# Patient Record
Sex: FEMALE | Race: WHITE | Employment: FULL TIME | URBAN - METROPOLITAN AREA
[De-identification: names, ages, dates, MRNs, and addresses within clinical notes are randomized per-mention and may not be internally consistent; named-entity substitution may affect disease eponyms.]

---

## 2021-07-21 NOTE — PROGRESS NOTES
PATIENT IDENTIFICATION  Name: Dayami Martinez  MRN: [id number]    Diagnoses:   Preventative health care  (primary encounter diagnosis)  Screening for diabetes mellitus  Screening for lipid disorders  Visit for screening mammogram  Anxiety and depression Maternal Grandmother         had and ASD   • Prostate Cancer Maternal Grandfather    • Diabetes Paternal Grandmother    • Diabetes Paternal Uncle      Past Surgical History:   Procedure Laterality Date   •      • HYSTERECTOMY  2017    irregular an stop or control worrying: Over half days  Worrying too much about different things   : Nearly every day  Trouble relaxing: Over half days  Being so restless that it's hard to sit still: Over half days  Becoming easily annoyed or irritable: Over half days Discussed treatment options. Will start lexapro, advised to start with 1/2 tab daily for one week then increase to 1 tab daily. Return to clinic in 4 to 6 weeks for re-evaluation and possible dose change.  Also referred to therapist, gave options of therapi

## 2021-07-28 NOTE — PROGRESS NOTES
Called and discussed SSRIs and NSAIDs risk with patient, will try for now, advised to watch for signs and symptoms of GI bleed and to stop immediately if this occurs. Will also check CBC in a few months as a precaution.    Patient also requesting claritin D

## 2021-09-22 NOTE — TELEPHONE ENCOUNTER
Attempted to reach pt re: Dr. Jane Allen. Unable to reach pt and left detailed msg to please return our call re: tomorrow's appt.      Joselito Zambrano

## 2021-09-22 NOTE — TELEPHONE ENCOUNTER
Called to screen for tomorrow's appointment due to comment of \"sinus headache\". Patient has history of sinusitis and Claritin has helped in the past. Not working this time. All on the right side causing constant headache and pressure.     Denies any othe

## 2021-09-23 NOTE — PROGRESS NOTES
Telehealth outside of [address] Verbal Consent   I conducted a telehealth visit with Dayana Lezama today, 09/23/21, which was completed using two-way, real-time interactive audio and video communication.  This has been done in good nhan to pro side of face feels pressure sensation right above and under the eye. No swelling or redness of eyelid. Has helped with rubbing sinus area. Has been told by partner that she was a little \"puffy\" around the eye area. No eye redness or eye pain.    H apparent distress and well developed and well nourished  HEENT: Normocephalic, atraumatic, per patient exam, slightly uncomfortable with firm palpation of forntal and maxillary sinus of R side. Lungs:normal respiratory effort.    Neuro: Gait normal   Skin

## 2021-10-15 PROBLEM — S99.912A INJURY OF LEFT ANKLE: Status: ACTIVE | Noted: 2021-10-15

## 2021-10-15 NOTE — ED PROVIDER NOTES
Patient Seen in: [facility]      History   Patient presents with:  Leg or Foot Injury: Either sprained or fractured left ankle. - Entered by patient    Stated Complaint: Leg or Foot Injury - Either sprained or fractured left ankle.     Subjec appears non-toxic. HEENT: Head is normocephalic, atraumatic. Nonicteric sclera, no conjunctival injection. No facial droop or slurred speech. No oral lesions or pallor. Mucous membranes moist.    Neck: Supple. Normal ROM.     Lungs:  Good inspiratory ef including chronic ligamentous calcifications and a chronic healed fracture of the distal tip of the lateral malleolus. Radha Motta is a question of subtle transverse lucency in the may also represent sequela of   the chronic healed fracture, however given overl medications    traMADol 50 MG Oral Tab  Take 1 tablet (50 mg total) by mouth every 6 (six) hours as needed for Pain., Normal, Disp-12 tablet, R-0    Misc. Devices (CRUTCH SET) Does not apply Misc  Please provide patient with a set of crutches. , Print, Disp

## 2021-10-15 NOTE — ED INITIAL ASSESSMENT (HPI)
Pt states about an hour ago was at a dog park when a few dogs roller her over and states twisted left ankle and fell over. Pt states having hx of breaking that ankle and having sprains.

## 2022-01-14 NOTE — PROGRESS NOTES
Sloan Mckinnon is a 48year old female. Patient presents with:  Hearing Loss: Pt says her hearing has been decreasing and would like a hearing test       HISTORY OF PRESENT ILLNESS  She presents with a history of decreased hearing.   She does have a fami Neuro Negative Tremors. Psych Negative Anxiety and depression. Integumentary Negative Frequent skin infections, pigment change and rash. Hema/Lymph Negative Easy bleeding and easy bruising.            PHYSICAL EXAM    Ht 5' 11\" (1.803 m)   Wt 185 l every 6 (six) hours as needed for Pain., Disp: 12 tablet, Rfl: 0  •  Misc. Devices (CRUTCH SET) Does not apply Misc, Please provide patient with a set of crutches., Disp: 1 each, Rfl: 0  •  loratadine 10 MG Oral Tab, Take 10 mg by mouth daily as needed. , D

## 2022-01-17 NOTE — TELEPHONE ENCOUNTER
Requested Prescriptions     Pending Prescriptions Disp Refills   • escitalopram 10 MG Oral Tab 90 tablet 1     Sig: Take 1 tablet (10 mg total) by mouth daily.      Last office visit: 9-23-21 virtual   Medication last refilled: 10-23-21

## 2024-05-03 ENCOUNTER — LAB ENCOUNTER (OUTPATIENT)
Dept: LAB | Facility: HOSPITAL | Age: 49
End: 2024-05-03
Attending: SURGERY
Payer: COMMERCIAL

## 2024-05-03 ENCOUNTER — OFFICE VISIT (OUTPATIENT)
Dept: OTOLARYNGOLOGY | Facility: CLINIC | Age: 49
End: 2024-05-03

## 2024-05-03 DIAGNOSIS — E04.1 THYROID NODULE: ICD-10-CM

## 2024-05-03 DIAGNOSIS — E04.1 THYROID NODULE: Primary | ICD-10-CM

## 2024-05-03 LAB
T3FREE SERPL-MCNC: 2.84 PG/ML (ref 2.4–4.2)
T4 FREE SERPL-MCNC: 1.3 NG/DL (ref 0.8–1.7)
TSI SER-ACNC: 2.84 MIU/ML (ref 0.55–4.78)

## 2024-05-03 PROCEDURE — 84481 FREE ASSAY (FT-3): CPT

## 2024-05-03 PROCEDURE — 36415 COLL VENOUS BLD VENIPUNCTURE: CPT

## 2024-05-03 PROCEDURE — 84443 ASSAY THYROID STIM HORMONE: CPT

## 2024-05-03 PROCEDURE — 84439 ASSAY OF FREE THYROXINE: CPT

## 2024-05-03 RX ORDER — MONTELUKAST SODIUM 10 MG/1
10 TABLET ORAL NIGHTLY
Qty: 30 TABLET | Refills: 3 | Status: SHIPPED | OUTPATIENT
Start: 2024-05-03

## 2024-05-03 RX ORDER — AZELASTINE 1 MG/ML
2 SPRAY, METERED NASAL 2 TIMES DAILY
Qty: 30 ML | Refills: 0 | Status: SHIPPED | OUTPATIENT
Start: 2024-05-03

## 2024-05-03 NOTE — PROGRESS NOTES
Jaquelin Hernandez is a 48 year old female.    Chief Complaint   Patient presents with    Thyroid Nodule     Thyroid nodules 2 medium size 1 large sized found on US thyroid done on 24       HISTORY OF PRESENT ILLNESS  She presents with a history of decreased hearing.  She does have a family history of hearing loss but denies any other significant otologic signs or symptoms.  No tinnitus no dizziness no history of otosclerosis in the family.  Audiogram was performed today demonstrating a conductive hearing loss on the right which is mild in nature with normal speech discrimination scores and tympanograms.  Normal hearing on the left.  Otosclerosis?      5/3/24 she presents with recent ultrasound demonstrating enlarged thyroid nodules on the right.  TR 3's and TR 5 nodule here for further evaluation and management.  Complains of throat discomfort and has recently been looked at for allergies.  Currently on no significant medications.      Social History     Socioeconomic History    Marital status: Single   Tobacco Use    Smoking status: Never    Smokeless tobacco: Never   Vaping Use    Vaping status: Never Used   Substance and Sexual Activity    Alcohol use: Never    Drug use: Never    Sexual activity: Yes     Partners: Female       Family History   Problem Relation Age of Onset    Uterine Cancer Mother 58    Breast Cancer Mother 55    Diabetes Father     Heart Disorder Maternal Grandmother         had and ASD    Prostate Cancer Maternal Grandfather     Diabetes Paternal Grandmother     Diabetes Paternal Uncle        Past Medical History:    Arthritis    Atrial septal defect (HCC)    corrected via surgery as a child at age 10.        Past Surgical History:   Procedure Laterality Date          Hysterectomy  2017    irregular and heavy cycles.     Needle biopsy left      benign    Splint, hammer toe           REVIEW OF SYSTEMS    System Neg/Pos Details   Constitutional Negative Fatigue, fever and weight  loss.   ENMT Negative Drooling.   Eyes Negative Blurred vision and vision changes.   Respiratory Negative Dyspnea and wheezing.   Cardio Negative Chest pain, irregular heartbeat/palpitations and syncope.   GI Negative Abdominal pain and diarrhea.   Endocrine Negative Cold intolerance and heat intolerance.   Neuro Negative Tremors.   Psych Negative Anxiety and depression.   Integumentary Negative Frequent skin infections, pigment change and rash.   Hema/Lymph Negative Easy bleeding and easy bruising.           PHYSICAL EXAM    [facility] 10/20/2014        Constitutional Normal Overall appearance - Normal.   Psychiatric Normal Orientation - Oriented to time, place, person & situation. Appropriate mood and affect.   Neck Exam Normal Inspection - Normal. Palpation - Normal. Parotid gland - Normal. Thyroid gland -enlarged right hemithyroid multinodular   Eyes Normal Conjunctiva - Right: Normal, Left: Normal. Pupil - Right: Normal, Left: Normal. Fundus - Right: Normal, Left: Normal.   Neurological Normal Memory - Normal. Cranial nerves - Cranial nerves II through XII grossly intact.   Head/Face Normal Facial features - Normal. Eyebrows - Normal. Skull - Normal.        Nasopharynx Normal External nose - Normal. Lips/teeth/gums - Normal. Tonsils - Normal. Oropharynx - Normal.   Ears Normal Inspection - Right: Normal, Left: Normal. Canal - Right: Normal, Left: Normal. TM - Right: Normal, Left: Normal.   Skin Normal Inspection - Normal.        Lymph Detail Normal Submental. Submandibular. Anterior cervical. Posterior cervical. Supraclavicular.        Nose/Mouth/Throat Normal External nose - Normal. Lips/teeth/gums - Normal. Tonsils - Normal. Oropharynx - Normal.   Nose/Mouth/Throat Normal Nares - Right: Normal Left: Normal. Septum -Normal  Turbinates - Right: Normal, Left: Normal.       Current Outpatient Medications:     montelukast 10 MG Oral Tab, Take 1 tablet (10 mg total) by mouth nightly., Disp: 30 tablet, Rfl: 3     loratadine-pseudoephedrine ER 5-120 MG Oral Tablet 12 Hr, Take 1 tablet by mouth every 12 (twelve) hours., Disp: 60 tablet, Rfl: 3    azelastine 0.1 % Nasal Solution, 2 sprays by Nasal route 2 (two) times daily., Disp: 30 mL, Rfl: 0    CELECOXIB 200 MG Oral Cap, TAKE 1 CAPSULE(200 MG) BY MOUTH DAILY, Disp: 90 capsule, Rfl: 0    escitalopram 10 MG Oral Tab, Take 1 tablet (10 mg total) by mouth daily., Disp: 90 tablet, Rfl: 0    Loratadine-Pseudoephedrine ER  MG Oral Tablet 24 Hr, Take 1 tablet by mouth daily., Disp: 30 tablet, Rfl: 1    traMADol 50 MG Oral Tab, Take 1 tablet (50 mg total) by mouth every 6 (six) hours as needed for Pain., Disp: 12 tablet, Rfl: 0    Misc. Devices (CRUTCH SET) Does not apply Misc, Please provide patient with a set of crutches., Disp: 1 each, Rfl: 0    loratadine 10 MG Oral Tab, Take 10 mg by mouth daily as needed., Disp: , Rfl:   ASSESSMENT AND PLAN    1. Thyroid nodule  Suspicious nodules of the right hemithyroid.  I did recommend ultrasound-guided biopsies of each.  In addition I did recommend getting some baseline thyroid function testing.  I will call in some Singulair Loratadine-D Astelin nasal spray for some congestive issues she is having and throat symptoms which I suspect are secondary to postnasal discharge.  She will return to see me after her biopsies to discuss further management but we did begin discussions regarding probable right hemithyroidectomy if no malignancy is found and total thyroidectomy if malignancy is noted.  - Assay, Thyroid Stim Hormone; Future  - Free T3 (Triiodothryronine); Future  - Free T4, (Free Thyroxine); Future  - US FINE NEEDLE ASPIRATION W GUIDE (CPT=10005); Future  - loratadine-pseudoephedrine ER 5-120 MG Oral Tablet 12 Hr; Take 1 tablet by mouth every 12 (twelve) hours.  Dispense: 60 tablet; Refill: 3        This note was prepared using Dragon Medical voice recognition dictation software. As a result errors may occur. When identified these  errors have been corrected. While every attempt is made to correct errors during dictation discrepancies may still exist    Dick Walters MD    5/3/2024    6:03 PM

## 2024-05-04 DIAGNOSIS — E04.1 THYROID NODULE: ICD-10-CM

## 2024-05-06 ENCOUNTER — PATIENT MESSAGE (OUTPATIENT)
Dept: OTOLARYNGOLOGY | Facility: CLINIC | Age: 49
End: 2024-05-06

## 2024-05-06 NOTE — TELEPHONE ENCOUNTER
From: Jaquelin Hernandez  To: Dick Walters  Sent: 5/6/2024 9:46 AM CDT  Subject: Claritin-D    When I saw you, you said you would prescribe 3 medications for my sinuses/allergies including the generic for Claritin-D. Ale only received the scripts for the other 2 and not Claritin-D. Could you please also send in a script for the Claritin-D 24 hr.     Jaquelin Hernandez.

## 2024-05-06 NOTE — TELEPHONE ENCOUNTER
I checked with the [facility] and they did get all 3 medications that were prescribed.  Patient's insurance isn't covering the brand name or the generic.  The Generic is $84.00 and the brand is $89.97.

## 2024-05-08 ENCOUNTER — TELEPHONE (OUTPATIENT)
Dept: OTOLARYNGOLOGY | Facility: CLINIC | Age: 49
End: 2024-05-08

## 2024-05-08 NOTE — TELEPHONE ENCOUNTER
US disc received and brought to radiology, they will upload for pt US guided FNA scheduled tomorrow.

## 2024-05-10 ENCOUNTER — HOSPITAL ENCOUNTER (OUTPATIENT)
Dept: ULTRASOUND IMAGING | Facility: HOSPITAL | Age: 49
Discharge: HOME | End: 2024-05-10
Attending: OTOLARYNGOLOGY

## 2024-05-10 DIAGNOSIS — E04.1 THYROID NODULE: ICD-10-CM

## 2024-05-10 PROCEDURE — 10005 FNA BX W/US GDN 1ST LES: CPT | Performed by: OTOLARYNGOLOGY

## 2024-05-10 PROCEDURE — 88173 CYTOPATH EVAL FNA REPORT: CPT | Performed by: OTOLARYNGOLOGY

## 2024-05-10 PROCEDURE — 10006 FNA BX W/US GDN EA ADDL: CPT | Performed by: OTOLARYNGOLOGY

## 2024-05-10 NOTE — PROCEDURES
[facility]   part of [facility]  Procedure Note    Jaquelin Hernandez Patient Status:  Outpatient    1975 MRN [id number]   Location [facility] ULTRASOUND Attending Dick Walters MD   Hosp Day # 0 PCP Matty Lopez DO     Procedure: US thyroid FNA x 3     Pre-Procedure Diagnosis:  Nodules    Post-Procedure Diagnosis: Same    Anesthesia:  Local    Findings:  5 x 25g FNA of each of 3 R thyroid nodules    Specimens: As above    Blood Loss:  Minimal    Tourniquet Time: None  Complications:  None  Drains:  None    Secondary Diagnosis:  None    Jimmy Lewis MD  5/10/2024

## 2024-05-14 ENCOUNTER — PATIENT MESSAGE (OUTPATIENT)
Dept: OTOLARYNGOLOGY | Facility: CLINIC | Age: 49
End: 2024-05-14

## 2024-05-15 NOTE — TELEPHONE ENCOUNTER
From: Jaquelin Hernandez  To: Dick Walters  Sent: 5/14/2024 6:27 PM CDT  Subject: Biopsy results    They sent the results of the biopsy via [employer]. Since one of the nodules came back malignant I am hoping I will hear from you Wednesday morning to see what is the next step.     Jaquelin

## 2024-05-15 NOTE — TELEPHONE ENCOUNTER
Called patient to schedule an appt with Dr. Walters tomorrow to discuss lab results and next steps, Jaquelin chose the appt at 12pm.

## 2024-05-16 ENCOUNTER — OFFICE VISIT (OUTPATIENT)
Dept: OTOLARYNGOLOGY | Facility: CLINIC | Age: 49
End: 2024-05-16

## 2024-05-16 DIAGNOSIS — C73 THYROID CANCER (HCC): Primary | ICD-10-CM

## 2024-05-16 PROCEDURE — 99214 OFFICE O/P EST MOD 30 MIN: CPT | Performed by: OTOLARYNGOLOGY

## 2024-05-16 PROCEDURE — G2211 COMPLEX E/M VISIT ADD ON: HCPCS | Performed by: OTOLARYNGOLOGY

## 2024-05-16 RX ORDER — MELOXICAM 7.5 MG/1
7.5 TABLET ORAL DAILY
COMMUNITY

## 2024-05-16 RX ORDER — SPIRONOLACTONE 50 MG/1
50 TABLET, FILM COATED ORAL DAILY
COMMUNITY
Start: 2023-02-23

## 2024-05-16 RX ORDER — VENLAFAXINE 25 MG/1
25 TABLET ORAL DAILY
COMMUNITY
Start: 2023-02-20

## 2024-05-16 NOTE — PROGRESS NOTES
Jaquelin Hernandez is a 48 year old female.    Chief Complaint   Patient presents with    Follow - Up     Patient is here to discuss thyroid nodule follow up and to discuss FNA results        HISTORY OF PRESENT ILLNESS  She presents with a history of decreased hearing.  She does have a family history of hearing loss but denies any other significant otologic signs or symptoms.  No tinnitus no dizziness no history of otosclerosis in the family.  Audiogram was performed today demonstrating a conductive hearing loss on the right which is mild in nature with normal speech discrimination scores and tympanograms.  Normal hearing on the left.  Otosclerosis?      5/3/24 she presents with recent ultrasound demonstrating enlarged thyroid nodules on the right.  TR 3's and TR 5 nodule here for further evaluation and management.  Complains of throat discomfort and has recently been looked at for allergies.  Currently on no significant medications.     5/16/24 she presents today to go over the results of her ultrasound-guided biopsies.  The TR 5 nodule demonstrated papillary carcinoma.  Here to discuss further management.  Has many questions regarding surgical management and postsurgical treatment.      Social History     Socioeconomic History    Marital status: Single   Tobacco Use    Smoking status: Never    Smokeless tobacco: Never   Vaping Use    Vaping status: Never Used   Substance and Sexual Activity    Alcohol use: Never    Drug use: Never    Sexual activity: Yes     Partners: Female       Family History   Problem Relation Age of Onset    Uterine Cancer Mother 58    Breast Cancer Mother 55    Diabetes Father     Heart Disorder Maternal Grandmother         had and ASD    Prostate Cancer Maternal Grandfather     Diabetes Paternal Grandmother     Diabetes Paternal Uncle        Past Medical History:    Arthritis    Atrial septal defect (HCC)    corrected via surgery as a child at age 10.        Past Surgical History:    Procedure Laterality Date          Hysterectomy  2017    irregular and heavy cycles.     Needle biopsy left      benign    Splint, hammer toe           REVIEW OF SYSTEMS    System Neg/Pos Details   Constitutional Negative Fatigue, fever and weight loss.   ENMT Negative Drooling.   Eyes Negative Blurred vision and vision changes.   Respiratory Negative Dyspnea and wheezing.   Cardio Negative Chest pain, irregular heartbeat/palpitations and syncope.   GI Negative Abdominal pain and diarrhea.   Endocrine Negative Cold intolerance and heat intolerance.   Neuro Negative Tremors.   Psych Negative Anxiety and depression.   Integumentary Negative Frequent skin infections, pigment change and rash.   Hema/Lymph Negative Easy bleeding and easy bruising.           PHYSICAL EXAM    [facility] 10/20/2014        Constitutional Normal Overall appearance - Normal.   Psychiatric Normal Orientation - Oriented to time, place, person & situation. Appropriate mood and affect.   Neck Exam Normal Inspection - Normal. Palpation - Normal. Parotid gland - Normal. Thyroid gland - Multi nodular thyroid   Eyes Normal Conjunctiva - Right: Normal, Left: Normal. Pupil - Right: Normal, Left: Normal. Fundus - Right: Normal, Left: Normal.   Neurological Normal Memory - Normal. Cranial nerves - Cranial nerves II through XII grossly intact.   Head/Face Normal Facial features - Normal. Eyebrows - Normal. Skull - Normal.        Nasopharynx Normal External nose - Normal. Lips/teeth/gums - Normal. Tonsils - Normal. Oropharynx - Normal.   Ears Normal Inspection - Right: Normal, Left: Normal. Canal - Right: Normal, Left: Normal. TM - Right: Normal, Left: Normal.   Skin Normal Inspection - Normal.        Lymph Detail Normal Submental. Submandibular. Anterior cervical. Posterior cervical. Supraclavicular.        Nose/Mouth/Throat Normal External nose - Normal. Lips/teeth/gums - Normal. Tonsils - Normal. Oropharynx - Normal.   Nose/Mouth/Throat Normal Nares  - Right: Normal Left: Normal. Septum -Normal  Turbinates - Right: Normal, Left: Normal.       Current Outpatient Medications:     Meloxicam 7.5 MG Oral Tab, Take 1 tablet (7.5 mg total) by mouth daily., Disp: , Rfl:     spironolactone 50 MG Oral Tab, Take 1 tablet (50 mg total) by mouth daily., Disp: , Rfl:     venlafaxine 25 MG Oral Tab, Take 1 tablet (25 mg total) by mouth daily., Disp: , Rfl:     montelukast 10 MG Oral Tab, Take 1 tablet (10 mg total) by mouth nightly., Disp: 30 tablet, Rfl: 3    azelastine 0.1 % Nasal Solution, 2 sprays by Nasal route 2 (two) times daily., Disp: 30 mL, Rfl: 0  ASSESSMENT AND PLAN    1. Thyroid cancer (HCC)  Results reviewed demonstrating papillary carcinoma from one of the nodules biopsied under ultrasound guidance.  We did have a detailed conversation regarding the risks and benefits of total thyroidectomy.  She specifically understands the risks to include but not be limited to postoperative pain, bleeding, anesthesia use as well as injury to the recurrent laryngeal nerve and parathyroids with resultant voice changes and/or hypocalcemia.  She states understanding accepts these risks and wishes to proceed.  We did also discuss postsurgical management which may include ablation with reactive iodine with post surgical TSH suppression.  Greater than 30 minutes were spent with the patient in discussions regarding her surgery and postop management.        This note was prepared using Dragon Medical voice recognition dictation software. As a result errors may occur. When identified these errors have been corrected. While every attempt is made to correct errors during dictation discrepancies may still exist    Dick Walters MD    5/16/2024    12:44 PM

## 2024-05-16 NOTE — H&P (VIEW-ONLY)
Jaquelin Hernandez is a 48 year old female.    Chief Complaint   Patient presents with    Follow - Up     Patient is here to discuss thyroid nodule follow up and to discuss FNA results        HISTORY OF PRESENT ILLNESS  She presents with a history of decreased hearing.  She does have a family history of hearing loss but denies any other significant otologic signs or symptoms.  No tinnitus no dizziness no history of otosclerosis in the family.  Audiogram was performed today demonstrating a conductive hearing loss on the right which is mild in nature with normal speech discrimination scores and tympanograms.  Normal hearing on the left.  Otosclerosis?      5/3/24 she presents with recent ultrasound demonstrating enlarged thyroid nodules on the right.  TR 3's and TR 5 nodule here for further evaluation and management.  Complains of throat discomfort and has recently been looked at for allergies.  Currently on no significant medications.     5/16/24 she presents today to go over the results of her ultrasound-guided biopsies.  The TR 5 nodule demonstrated papillary carcinoma.  Here to discuss further management.  Has many questions regarding surgical management and postsurgical treatment.      Social History     Socioeconomic History    Marital status: Single   Tobacco Use    Smoking status: Never    Smokeless tobacco: Never   Vaping Use    Vaping status: Never Used   Substance and Sexual Activity    Alcohol use: Never    Drug use: Never    Sexual activity: Yes     Partners: Female       Family History   Problem Relation Age of Onset    Uterine Cancer Mother 58    Breast Cancer Mother 55    Diabetes Father     Heart Disorder Maternal Grandmother         had and ASD    Prostate Cancer Maternal Grandfather     Diabetes Paternal Grandmother     Diabetes Paternal Uncle        Past Medical History:    Arthritis    Atrial septal defect (HCC)    corrected via surgery as a child at age 10.        Past Surgical History:    Procedure Laterality Date          Hysterectomy  2017    irregular and heavy cycles.     Needle biopsy left      benign    Splint, hammer toe           REVIEW OF SYSTEMS    System Neg/Pos Details   Constitutional Negative Fatigue, fever and weight loss.   ENMT Negative Drooling.   Eyes Negative Blurred vision and vision changes.   Respiratory Negative Dyspnea and wheezing.   Cardio Negative Chest pain, irregular heartbeat/palpitations and syncope.   GI Negative Abdominal pain and diarrhea.   Endocrine Negative Cold intolerance and heat intolerance.   Neuro Negative Tremors.   Psych Negative Anxiety and depression.   Integumentary Negative Frequent skin infections, pigment change and rash.   Hema/Lymph Negative Easy bleeding and easy bruising.           PHYSICAL EXAM    [facility] 10/20/2014        Constitutional Normal Overall appearance - Normal.   Psychiatric Normal Orientation - Oriented to time, place, person & situation. Appropriate mood and affect.   Neck Exam Normal Inspection - Normal. Palpation - Normal. Parotid gland - Normal. Thyroid gland - Multi nodular thyroid   Eyes Normal Conjunctiva - Right: Normal, Left: Normal. Pupil - Right: Normal, Left: Normal. Fundus - Right: Normal, Left: Normal.   Neurological Normal Memory - Normal. Cranial nerves - Cranial nerves II through XII grossly intact.   Head/Face Normal Facial features - Normal. Eyebrows - Normal. Skull - Normal.        Nasopharynx Normal External nose - Normal. Lips/teeth/gums - Normal. Tonsils - Normal. Oropharynx - Normal.   Ears Normal Inspection - Right: Normal, Left: Normal. Canal - Right: Normal, Left: Normal. TM - Right: Normal, Left: Normal.   Skin Normal Inspection - Normal.        Lymph Detail Normal Submental. Submandibular. Anterior cervical. Posterior cervical. Supraclavicular.        Nose/Mouth/Throat Normal External nose - Normal. Lips/teeth/gums - Normal. Tonsils - Normal. Oropharynx - Normal.   Nose/Mouth/Throat Normal Nares  - Right: Normal Left: Normal. Septum -Normal  Turbinates - Right: Normal, Left: Normal.       Current Outpatient Medications:     Meloxicam 7.5 MG Oral Tab, Take 1 tablet (7.5 mg total) by mouth daily., Disp: , Rfl:     spironolactone 50 MG Oral Tab, Take 1 tablet (50 mg total) by mouth daily., Disp: , Rfl:     venlafaxine 25 MG Oral Tab, Take 1 tablet (25 mg total) by mouth daily., Disp: , Rfl:     montelukast 10 MG Oral Tab, Take 1 tablet (10 mg total) by mouth nightly., Disp: 30 tablet, Rfl: 3    azelastine 0.1 % Nasal Solution, 2 sprays by Nasal route 2 (two) times daily., Disp: 30 mL, Rfl: 0  ASSESSMENT AND PLAN    1. Thyroid cancer (HCC)  Results reviewed demonstrating papillary carcinoma from one of the nodules biopsied under ultrasound guidance.  We did have a detailed conversation regarding the risks and benefits of total thyroidectomy.  She specifically understands the risks to include but not be limited to postoperative pain, bleeding, anesthesia use as well as injury to the recurrent laryngeal nerve and parathyroids with resultant voice changes and/or hypocalcemia.  She states understanding accepts these risks and wishes to proceed.  We did also discuss postsurgical management which may include ablation with reactive iodine with post surgical TSH suppression.  Greater than 30 minutes were spent with the patient in discussions regarding her surgery and postop management.        This note was prepared using Dragon Medical voice recognition dictation software. As a result errors may occur. When identified these errors have been corrected. While every attempt is made to correct errors during dictation discrepancies may still exist    Dick Walters MD    5/16/2024    12:44 PM

## 2024-05-17 ENCOUNTER — TELEPHONE (OUTPATIENT)
Dept: OTOLARYNGOLOGY | Facility: CLINIC | Age: 49
End: 2024-05-17

## 2024-05-17 DIAGNOSIS — C73 THYROID CANCER (HCC): Primary | ICD-10-CM

## 2024-05-31 RX ORDER — ACETAMINOPHEN 500 MG
500 TABLET ORAL EVERY 6 HOURS PRN
COMMUNITY

## 2024-06-04 ENCOUNTER — ANESTHESIA EVENT (OUTPATIENT)
Dept: SURGERY | Facility: HOSPITAL | Age: 49
End: 2024-06-04
Payer: COMMERCIAL

## 2024-06-04 NOTE — DISCHARGE INSTRUCTIONS
HOME INSTRUCTIONS  AMBSURG HOME CARE INSTRUCTIONS: POST-OP ANESTHESIA  The medication that you received for sedation or general anesthesia can last up to 24 hours. Your judgment and reflexes may be altered, even if you feel like your normal self.      We Recommend:   Do not drive any motor vehicle or bicycle   Avoid mowing the lawn, playing sports, or working with power tools/applicances (power saws, electric knives or mixers)   That you have someone stay with you on your first night home   Do not drink alcohol or take sleeping pills or tranquilizers   Do not sign legal documents within 24 hours of your procedure   If you had a nerve block for your surgery, take extra care not to put any pressure on your arm or hand for 24 hours    It is normal:  For you to have a sore throat if you had a breathing tube during surgery (while you were asleep!). The sore throat should get better within 48 hours. You can gargle with warm salt water (1/2 tsp in 4 oz warm water) or use a throat lozenge for comfort  To feel muscle aches or soreness especially in the abdomen, chest or neck. The achy feeling should go away in the next 24 hours  To feel weak, sleepy or \"wiped out\". Your should start feeling better in the next 24 hours.   To experience mild discomforts such as sore lip or tongue, headache, cramps, gas pains or a bloated feeling in your abdomen.   To experience mild back pain or soreness for a day or two if you had spinal or epidural anesthesia.   If you had laparoscopic surgery, to feel shoulder pain or discomfort on the day of surgery.   For some patients to have nausea after surgery/anesthesia    If you feel nausea or experience vomiting:   Try to move around less.   Eat less than usual or drink only liquids until the next morning   Nausea should resolve in about 24 hours    If you have a problem when you are at home:    Call your surgeons office   Discharge Instructions: After Your Surgery  You’ve just had surgery. During  surgery, you were given medicine called anesthesia to keep you relaxed and free of pain. After surgery, you may have some pain or nausea. This is common. Here are some tips for feeling better and getting well after surgery.   Going home  Your healthcare provider will show you how to take care of yourself when you go home. They'll also answer your questions. Have an adult family member or friend drive you home. For the first 24 hours after your surgery:   Don't drive or use heavy equipment.  Don't make important decisions or sign legal papers.  Take medicines as directed.  Don't drink alcohol.  Have someone stay with you, if needed. They can watch for problems and help keep you safe.  Be sure to go to all follow-up visits with your healthcare provider. And rest after your surgery for as long as your provider tells you to.   Coping with pain  If you have pain after surgery, pain medicine will help you feel better. Take it as directed, before pain becomes severe. Also, ask your healthcare provider or pharmacist about other ways to control pain. This might be with heat, ice, or relaxation. And follow any other instructions your surgeon or nurse gives you.      Stay on schedule with your medicine.     Tips for taking pain medicine  To get the best relief possible, remember these points:   Pain medicines can upset your stomach. Taking them with a little food may help.  Most pain relievers taken by mouth need at least 20 to 30 minutes to start to work.  Don't wait till your pain becomes severe before you take your medicine. Try to time your medicine so that you can take it before starting an activity. This might be before you get dressed, go for a walk, or sit down for dinner.  Constipation is a common side effect of some pain medicines. Call your healthcare provider before taking any medicines such as laxatives or stool softeners to help ease constipation. Also ask if you should skip any foods. Drinking lots of fluids and  eating foods such as fruits and vegetables that are high in fiber can also help. Remember, don't take laxatives unless your surgeon has prescribed them.  Drinking alcohol and taking pain medicine can cause dizziness and slow your breathing. It can even be deadly. Don't drink alcohol while taking pain medicine.  Pain medicine can make you react more slowly to things. Don't drive or run machinery while taking pain medicine.  Your healthcare provider may tell you to take acetaminophen to help ease your pain. Ask them how much you're supposed to take each day. Acetaminophen or other pain relievers may interact with your prescription medicines or other over-the-counter (OTC) medicines. Some prescription medicines have acetaminophen and other ingredients in them. Using both prescription and OTC acetaminophen for pain can cause you to accidentally overdose. Read the labels on your OTC medicines with care. This will help you to clearly know the list of ingredients, how much to take, and any warnings. It may also help you not take too much acetaminophen. If you have questions or don't understand the information, ask your pharmacist or healthcare provider to explain it to you before you take the OTC medicine.   Managing nausea  Some people have an upset stomach (nausea) after surgery. This is often because of anesthesia, pain, or pain medicine, less movement of food in the stomach, or the stress of surgery. These tips will help you handle nausea and eat healthy foods as you get better. If you were on a special food plan before surgery, ask your healthcare provider if you should follow it while you get better. Check with your provider on how your eating should progress. It may depend on the surgery you had. These general tips may help:   Don't push yourself to eat. Your body will tell you when to eat and how much.  Start off with clear liquids and soup. They're easier to digest.  Next try semi-solid foods as you feel ready.  These include mashed potatoes, applesauce, and gelatin.  Slowly move to solid foods. Don’t eat fatty, rich, or spicy foods at first.  Don't force yourself to have 3 large meals a day. Instead eat smaller amounts more often.  Take pain medicines with a small amount of solid food, such as crackers or toast. This helps prevent nausea.  When to call your healthcare provider  Call your healthcare provider right away if you have any of these:   You still have too much pain, or the pain gets worse, after taking the medicine. The medicine may not be strong enough. Or there may be a complication from the surgery.  You feel too sleepy, dizzy, or groggy. The medicine may be too strong.  Side effects such as nausea or vomiting. Your healthcare provider may advise taking other medicines to .  Skin changes such as rash, itching, or hives. This may mean you have an allergic reaction. Your provider may advise taking other medicines.  The incision looks different (for instance, part of it opens up).  Bleeding or fluid leaking from the incision site, and weren't told to expect that.  Fever of 100.4°F (38°C) or higher, or as directed by your provider.  Call 911  Call 911 right away if you have:   Trouble breathing  Facial swelling    If you have obstructive sleep apnea   You were given anesthesia medicine during surgery to keep you comfortable and free of pain. After surgery, you may have more apnea spells because of this medicine and other medicines you were given. The spells may last longer than normal.    At home:  Keep using the continuous positive airway pressure (CPAP) device when you sleep. Unless your healthcare provider tells you not to, use it when you sleep, day or night. CPAP is a common device used to treat obstructive sleep apnea.  Talk with your provider before taking any pain medicine, muscle relaxants, or sedatives. Your provider will tell you about the possible dangers of taking these medicines.  Contact your  provider if your sleeping changes a lot even when taking medicines as directed.  Niraj last reviewed this educational content on 10/1/2021  © 6588-0880 [employer]. All rights reserved. This information is not intended as a substitute for professional medical care. Always follow your healthcare professional's instructions.

## 2024-06-05 ENCOUNTER — ANESTHESIA (OUTPATIENT)
Dept: SURGERY | Facility: HOSPITAL | Age: 49
End: 2024-06-05
Payer: COMMERCIAL

## 2024-06-05 ENCOUNTER — HOSPITAL ENCOUNTER (OUTPATIENT)
Facility: HOSPITAL | Age: 49
Setting detail: OBSERVATION
Discharge: HOME | End: 2024-06-06
Attending: OTOLARYNGOLOGY | Admitting: OTOLARYNGOLOGY
Payer: COMMERCIAL

## 2024-06-05 DIAGNOSIS — C73 THYROID CANCER (HCC): ICD-10-CM

## 2024-06-05 PROCEDURE — 07B10ZX EXCISION OF RIGHT NECK LYMPHATIC, OPEN APPROACH, DIAGNOSTIC: ICD-10-PCS | Performed by: OTOLARYNGOLOGY

## 2024-06-05 PROCEDURE — 0GTK0ZZ RESECTION OF THYROID GLAND, OPEN APPROACH: ICD-10-PCS | Performed by: OTOLARYNGOLOGY

## 2024-06-05 PROCEDURE — 99222 1ST HOSP IP/OBS MODERATE 55: CPT | Performed by: HOSPITALIST

## 2024-06-05 RX ORDER — DIPHENHYDRAMINE HYDROCHLORIDE 50 MG/ML
12.5 INJECTION INTRAMUSCULAR; INTRAVENOUS ONCE
Status: COMPLETED | OUTPATIENT
Start: 2024-06-05 | End: 2024-06-05

## 2024-06-05 RX ORDER — ONDANSETRON 2 MG/ML
INJECTION INTRAMUSCULAR; INTRAVENOUS AS NEEDED
Status: DISCONTINUED | OUTPATIENT
Start: 2024-06-05 | End: 2024-06-06 | Stop reason: SURG

## 2024-06-05 RX ORDER — ACETAMINOPHEN 500 MG
1000 TABLET ORAL ONCE
Status: COMPLETED | OUTPATIENT
Start: 2024-06-05 | End: 2024-06-05

## 2024-06-05 RX ORDER — LABETALOL HYDROCHLORIDE 5 MG/ML
INJECTION, SOLUTION INTRAVENOUS AS NEEDED
Status: DISCONTINUED | OUTPATIENT
Start: 2024-06-05 | End: 2024-06-06 | Stop reason: SURG

## 2024-06-05 RX ORDER — SODIUM CHLORIDE, SODIUM LACTATE, POTASSIUM CHLORIDE, CALCIUM CHLORIDE 600; 310; 30; 20 MG/100ML; MG/100ML; MG/100ML; MG/100ML
INJECTION, SOLUTION INTRAVENOUS CONTINUOUS
Status: DISCONTINUED | OUTPATIENT
Start: 2024-06-05 | End: 2024-06-06

## 2024-06-05 RX ORDER — CLINDAMYCIN PHOSPHATE 150 MG/ML
INJECTION, SOLUTION INTRAVENOUS AS NEEDED
Status: DISCONTINUED | OUTPATIENT
Start: 2024-06-05 | End: 2024-06-06 | Stop reason: SURG

## 2024-06-05 RX ORDER — CALCIUM CARBONATE 500 MG/1
1500 TABLET, CHEWABLE ORAL
Status: DISCONTINUED | OUTPATIENT
Start: 2024-06-05 | End: 2024-06-06

## 2024-06-05 RX ORDER — LIDOCAINE HYDROCHLORIDE 10 MG/ML
INJECTION, SOLUTION EPIDURAL; INFILTRATION; INTRACAUDAL; PERINEURAL AS NEEDED
Status: DISCONTINUED | OUTPATIENT
Start: 2024-06-05 | End: 2024-06-06 | Stop reason: SURG

## 2024-06-05 RX ORDER — ACETAMINOPHEN 500 MG
1000 TABLET ORAL ONCE AS NEEDED
Status: DISCONTINUED | OUTPATIENT
Start: 2024-06-05 | End: 2024-06-05 | Stop reason: HOSPADM

## 2024-06-05 RX ORDER — OXYCODONE HYDROCHLORIDE 5 MG/1
5 TABLET ORAL EVERY 4 HOURS PRN
Status: DISCONTINUED | OUTPATIENT
Start: 2024-06-05 | End: 2024-06-06

## 2024-06-05 RX ORDER — HYDROMORPHONE HYDROCHLORIDE 1 MG/ML
0.8 INJECTION, SOLUTION INTRAMUSCULAR; INTRAVENOUS; SUBCUTANEOUS EVERY 2 HOUR PRN
Status: DISCONTINUED | OUTPATIENT
Start: 2024-06-05 | End: 2024-06-06

## 2024-06-05 RX ORDER — NALOXONE HYDROCHLORIDE 0.4 MG/ML
0.08 INJECTION, SOLUTION INTRAMUSCULAR; INTRAVENOUS; SUBCUTANEOUS AS NEEDED
Status: DISCONTINUED | OUTPATIENT
Start: 2024-06-05 | End: 2024-06-05 | Stop reason: HOSPADM

## 2024-06-05 RX ORDER — HYDROMORPHONE HYDROCHLORIDE 1 MG/ML
0.6 INJECTION, SOLUTION INTRAMUSCULAR; INTRAVENOUS; SUBCUTANEOUS EVERY 5 MIN PRN
Status: DISCONTINUED | OUTPATIENT
Start: 2024-06-05 | End: 2024-06-05 | Stop reason: HOSPADM

## 2024-06-05 RX ORDER — ONDANSETRON 2 MG/ML
4 INJECTION INTRAMUSCULAR; INTRAVENOUS EVERY 6 HOURS PRN
Status: DISCONTINUED | OUTPATIENT
Start: 2024-06-05 | End: 2024-06-05 | Stop reason: HOSPADM

## 2024-06-05 RX ORDER — SODIUM CHLORIDE, SODIUM LACTATE, POTASSIUM CHLORIDE, CALCIUM CHLORIDE 600; 310; 30; 20 MG/100ML; MG/100ML; MG/100ML; MG/100ML
INJECTION, SOLUTION INTRAVENOUS CONTINUOUS
Status: DISCONTINUED | OUTPATIENT
Start: 2024-06-05 | End: 2024-06-05 | Stop reason: HOSPADM

## 2024-06-05 RX ORDER — DEXAMETHASONE SODIUM PHOSPHATE 4 MG/ML
VIAL (ML) INJECTION AS NEEDED
Status: DISCONTINUED | OUTPATIENT
Start: 2024-06-05 | End: 2024-06-06 | Stop reason: SURG

## 2024-06-05 RX ORDER — HYDROMORPHONE HYDROCHLORIDE 1 MG/ML
0.4 INJECTION, SOLUTION INTRAMUSCULAR; INTRAVENOUS; SUBCUTANEOUS EVERY 5 MIN PRN
Status: DISCONTINUED | OUTPATIENT
Start: 2024-06-05 | End: 2024-06-05 | Stop reason: HOSPADM

## 2024-06-05 RX ORDER — SODIUM CHLORIDE 0.9 % (FLUSH) 0.9 %
10 SYRINGE (ML) INJECTION AS NEEDED
Status: DISCONTINUED | OUTPATIENT
Start: 2024-06-05 | End: 2024-06-06

## 2024-06-05 RX ORDER — HYDROMORPHONE HYDROCHLORIDE 1 MG/ML
0.4 INJECTION, SOLUTION INTRAMUSCULAR; INTRAVENOUS; SUBCUTANEOUS EVERY 2 HOUR PRN
Status: DISCONTINUED | OUTPATIENT
Start: 2024-06-05 | End: 2024-06-06

## 2024-06-05 RX ORDER — SULFAMETHOXAZOLE AND TRIMETHOPRIM 800; 160 MG/1; MG/1
1 TABLET ORAL EVERY 12 HOURS SCHEDULED
Status: DISCONTINUED | OUTPATIENT
Start: 2024-06-05 | End: 2024-06-06

## 2024-06-05 RX ORDER — ROCURONIUM BROMIDE 10 MG/ML
INJECTION, SOLUTION INTRAVENOUS AS NEEDED
Status: DISCONTINUED | OUTPATIENT
Start: 2024-06-05 | End: 2024-06-06 | Stop reason: SURG

## 2024-06-05 RX ORDER — LIDOCAINE HYDROCHLORIDE AND EPINEPHRINE 10; 10 MG/ML; UG/ML
INJECTION, SOLUTION INFILTRATION; PERINEURAL AS NEEDED
Status: DISCONTINUED | OUTPATIENT
Start: 2024-06-05 | End: 2024-06-05 | Stop reason: HOSPADM

## 2024-06-05 RX ORDER — MONTELUKAST SODIUM 10 MG/1
10 TABLET ORAL NIGHTLY
Status: DISCONTINUED | OUTPATIENT
Start: 2024-06-05 | End: 2024-06-05

## 2024-06-05 RX ORDER — HYDROMORPHONE HYDROCHLORIDE 1 MG/ML
INJECTION, SOLUTION INTRAMUSCULAR; INTRAVENOUS; SUBCUTANEOUS
Status: COMPLETED
Start: 2024-06-05 | End: 2024-06-05

## 2024-06-05 RX ORDER — ACETAMINOPHEN 325 MG/1
650 TABLET ORAL
Status: DISCONTINUED | OUTPATIENT
Start: 2024-06-05 | End: 2024-06-06

## 2024-06-05 RX ORDER — OXYCODONE HYDROCHLORIDE 5 MG/1
10 TABLET ORAL EVERY 4 HOURS PRN
Status: DISCONTINUED | OUTPATIENT
Start: 2024-06-05 | End: 2024-06-06

## 2024-06-05 RX ORDER — PROCHLORPERAZINE EDISYLATE 5 MG/ML
5 INJECTION INTRAMUSCULAR; INTRAVENOUS EVERY 8 HOURS PRN
Status: DISCONTINUED | OUTPATIENT
Start: 2024-06-05 | End: 2024-06-05 | Stop reason: HOSPADM

## 2024-06-05 RX ORDER — HYDROMORPHONE HYDROCHLORIDE 1 MG/ML
0.2 INJECTION, SOLUTION INTRAMUSCULAR; INTRAVENOUS; SUBCUTANEOUS EVERY 5 MIN PRN
Status: DISCONTINUED | OUTPATIENT
Start: 2024-06-05 | End: 2024-06-05 | Stop reason: HOSPADM

## 2024-06-05 RX ORDER — CALCITRIOL 0.25 UG/1
0.25 CAPSULE, LIQUID FILLED ORAL DAILY
Status: DISCONTINUED | OUTPATIENT
Start: 2024-06-05 | End: 2024-06-06

## 2024-06-05 RX ADMIN — LIDOCAINE HYDROCHLORIDE 100 MG: 10 INJECTION, SOLUTION EPIDURAL; INFILTRATION; INTRACAUDAL; PERINEURAL at 14:45:00

## 2024-06-05 RX ADMIN — ONDANSETRON 4 MG: 2 INJECTION INTRAMUSCULAR; INTRAVENOUS at 16:08:00

## 2024-06-05 RX ADMIN — DEXAMETHASONE SODIUM PHOSPHATE 4 MG: 4 MG/ML VIAL (ML) INJECTION at 14:51:00

## 2024-06-05 RX ADMIN — LABETALOL HYDROCHLORIDE 5 MG: 5 INJECTION, SOLUTION INTRAVENOUS at 15:09:00

## 2024-06-05 RX ADMIN — CLINDAMYCIN PHOSPHATE 900 MG: 150 INJECTION, SOLUTION INTRAVENOUS at 14:55:00

## 2024-06-05 RX ADMIN — ROCURONIUM BROMIDE 50 MG: 10 INJECTION, SOLUTION INTRAVENOUS at 14:46:00

## 2024-06-05 NOTE — ANESTHESIA PROCEDURE NOTES
Airway  Date/Time: 6/5/2024 2:47 PM  Urgency: elective      General Information and Staff    Patient location during procedure: OR  Anesthesiologist: Costa Figueredo MD  Performed: anesthesiologist   Performed by: Costa Figueredo MD  Authorized by: Costa Figueredo MD      Indications and Patient Condition  Indications for airway management: anesthesia  Spontaneous ventilation: present  Sedation level: deep  Preoxygenated: yes  Patient position: sniffing  MILS maintained throughout  Mask difficulty assessment: 1 - vent by mask    Final Airway Details  Final airway type: endotracheal airway      Successful airway: ETT  Cuffed: yes   Successful intubation technique: direct laryngoscopy  Endotracheal tube insertion site: oral  Blade: Leonela  Blade size: #3  ETT size (mm): 7.5    Cormack-Lehane Classification: grade I - full view of glottis  Placement verified by: capnometry   Measured from: lips  ETT to lips (cm): 23  Number of attempts at approach: 1  Ventilation between attempts: none  Number of other approaches attempted: 0

## 2024-06-05 NOTE — ANESTHESIA PREPROCEDURE EVALUATION
Anesthesia PreOp Note    49 year old F PMHx thyroid Ca; presenting for total thyroidectomy.    HPI:     Jaquelin Hernandez is a 49 year old female who presents for preoperative consultation requested by: Dick Walters MD    Date of Surgery: 2024    Procedure(s):  Total thyroidectomy  Indication: Thyroid cancer (HCC) [C73]    Relevant Problems   No relevant active problems       NPO:                         History Review:  Patient Active Problem List    Diagnosis Date Noted    Injury of left ankle 10/15/2021       Past Medical History:    Arthritis    Atrial septal defect (HCC)    corrected via surgery as a child at age 10.     Congenital anomaly of heart (HCC)       Past Surgical History:   Procedure Laterality Date          Hysterectomy  2017    irregular and heavy cycles.     Needle biopsy left      benign    Splint, hammer toe         No medications prior to admission.     No current Epic-ordered facility-administered medications on file.     Current Outpatient Medications Ordered in Epic   Medication Sig Dispense Refill    Loratadine-Pseudoephedrine (CLARITIN-D 12 HOUR OR) Take by mouth.      acetaminophen 500 MG Oral Tab Take 1 tablet (500 mg total) by mouth every 6 (six) hours as needed for Pain.      montelukast 10 MG Oral Tab Take 1 tablet (10 mg total) by mouth nightly. 30 tablet 3       Allergies   Allergen Reactions    Erythromycin NAUSEA AND VOMITING and OTHER (SEE COMMENTS)     Reactions: migraines, vomiting.   migraines      Penicillins ANAPHYLAXIS and UNKNOWN     NO SKIN BLISTERING  States her mother told her she almost  as an infant when she received pcn       Family History   Problem Relation Age of Onset    Uterine Cancer Mother 58    Breast Cancer Mother 55    Diabetes Father     Heart Disorder Maternal Grandmother         had and ASD    Prostate Cancer Maternal Grandfather     Diabetes Paternal Grandmother     Diabetes Paternal Uncle      Social History     Socioeconomic  History    Marital status: Single   Tobacco Use    Smoking status: Never    Smokeless tobacco: Never   Vaping Use    Vaping status: Never Used   Substance and Sexual Activity    Alcohol use: Never    Drug use: Never    Sexual activity: Yes     Partners: Female       Available pre-op labs reviewed.             Vital Signs:  Body mass index is 25.8 kg/m².   height is 1.803 m (5' 11\") and weight is 83.9 kg (185 lb).   Vitals:    05/31/24 1715   Weight: 83.9 kg (185 lb)   Height: 1.803 m (5' 11\")        Anesthesia Evaluation      No history of anesthetic complications   Airway   Mallampati: II  TM distance: >3 FB  Neck ROM: full  Dental      Comment: Risks of oral and dental damage including but not limited to cut/bloody lips or gums, damage to or dislodgment of native teeth or prosthetics/implants discussed preoperatively, with patient expressing understanding and desire to proceed with anesthetic. Patient denies knowledge of any additional damage/disease/weakness of teeth not otherwise documented in pre-anesthetic evaluation.        Pulmonary - negative ROS and normal exam   (-) COPD, asthma, shortness of breath, recent URI, sleep apnea  Cardiovascular - normal exam  Exercise tolerance: good  (-) hypertension    ROS comment: S/p ASD repair as a child    Neuro/Psych - negative ROS   (-) seizures, TIA, CVA    GI/Hepatic/Renal - negative ROS   (-) GERD, liver disease, renal disease    Endo/Other    (-) diabetes mellitus, hypothyroidism, hyperthyroidism    Comments: Thyroid Ca  Abdominal  - normal exam                 Anesthesia Plan:   ASA:  2  Plan:   General  Airway:  ETT  Informed Consent Plan and Risks Discussed With:  Patient      I have informed Jaquelin Hernandez and/or legal guardian or family member of the nature of the anesthetic plan, benefits, risks including possible dental damage if relevant, major complications, and any alternative forms of anesthetic management.   All of the patient's questions were  answered to the best of my ability. The patient desires the anesthetic management as planned.  Costa Figueredo MD  6/5/2024 6:37 AM  Present on Admission:  **None**

## 2024-06-05 NOTE — CONSULTS
[facility]    PATIENT'S NAME: CRISTAL SHELL   ATTENDING PHYSICIAN: Dick Walters MD   CONSULTING PHYSICIAN: Anup Agee MD   PATIENT ACCOUNT#:   [id number]    LOCATION:  [facility] PACU 3 [facility] 10  MEDICAL RECORD #:   [id number]       YOB: 1975  ADMISSION DATE:       2024      CONSULT DATE:  2024    REPORT OF CONSULTATION      REASON FOR ADMISSION:  Post total thyroidectomy.    HISTORY OF PRESENT ILLNESS:  Patient is a 49-year-old  female who was found to have thyroid enlargement.  Ultrasound showed thyroid nodules on the right thyroid lobe.  Fine-needle aspiration pathology showed papillary thyroid carcinoma.  She had normal thyroid function and referred to ENT, Dr. Dick Walters, and scheduled today for above-mentioned procedure.  Postoperatively, transferred to PACU.    PAST MEDICAL HISTORY:  Generalized osteoarthritis and seasonal allergies.    PAST SURGICAL HISTORY:  Congenital heart defect, status post ASD repair as a child; ; hysterectomy; and left breast biopsy.    MEDICATIONS:  Please see medication reconciliation list.     ALLERGIES:  Penicillin.    SOCIAL HISTORY:  No tobacco, alcohol, or drug use.  Lives with her family.  Independent for basic activities of daily living.     FAMILY HISTORY:  Mother had breast cancer and endometrial cancer.  Father had diabetes mellitus type 2 and heart disease.      REVIEW OF SYSTEMS:  Currently resting in bed.  She has neck discomfort.  No nausea or vomiting.  No chest pain.  No  shortness of breath.  Other 12-point review of systems is negative.       PHYSICAL EXAMINATION:    GENERAL:  Alert and oriented to time, place and person.  Mild distress.   VITAL SIGNS:  Temperature 97.7, pulse 67, respiratory rate 21, blood pressure 140/93, pulse ox 100% on 2L nasal cannula oxygen.   HEENT:  Atraumatic.  Oropharynx clear.  Moist mucous membranes.  Normal hard and soft palate.  Eyes:  Anicteric sclerae.    NECK:   Supple.  No lymphadenopathy.  Trachea midline.  Full range of motion.  Anterior neck dressing with JAYNE drain.  LUNGS:  Clear to auscultation bilaterally.  Normal respiratory effort.    HEART:  Regular rate and rhythm.  S1 and S2 auscultated.  No murmur.    ABDOMEN:  Soft, nondistended.  No tenderness.  Positive bowel sounds.   EXTREMITIES:  No peripheral edema, clubbing or cyanosis.   NEUROLOGIC:  Motor and sensory intact.      ASSESSMENT AND PLAN:  Thyroid papillary carcinoma, status post total thyroidectomy.  Pain control.  Monitor surgical wound and drain.  DVT prophylaxis.  Monitor calcium level.  Full pathology report still pending.  Further recommendations to follow.     Dictated By Anup Agee MD  d: 06/05/2024 16:51:48  t: 06/05/2024 17:06:27  Job [id number]  FB/

## 2024-06-05 NOTE — INTERVAL H&P NOTE
Pre-op Diagnosis: Thyroid cancer (HCC) [C73]    The above referenced H&P was reviewed by Dick Walters MD on 6/5/2024, the patient was examined and no significant changes have occurred in the patient's condition since the H&P was performed.  I discussed with the patient and/or legal representative the potential benefits, risks and side effects of this procedure; the likelihood of the patient achieving goals; and potential problems that might occur during recuperation.  I discussed reasonable alternatives to the procedure, including risks, benefits and side effects related to the alternatives and risks related to not receiving this procedure.  We will proceed with procedure as planned.

## 2024-06-06 VITALS
DIASTOLIC BLOOD PRESSURE: 80 MMHG | RESPIRATION RATE: 19 BRPM | OXYGEN SATURATION: 97 % | SYSTOLIC BLOOD PRESSURE: 138 MMHG | BODY MASS INDEX: 25.62 KG/M2 | HEIGHT: 71 IN | TEMPERATURE: 98 F | WEIGHT: 183 LBS | HEART RATE: 85 BPM

## 2024-06-06 LAB
ALBUMIN SERPL-MCNC: 4.7 G/DL (ref 3.2–4.8)
ALBUMIN/GLOB SERPL: 1.8 {RATIO} (ref 1–2)
ALP LIVER SERPL-CCNC: 65 U/L
ALT SERPL-CCNC: 20 U/L
ANION GAP SERPL CALC-SCNC: 7 MMOL/L (ref 0–18)
AST SERPL-CCNC: 22 U/L (ref ?–34)
BILIRUB SERPL-MCNC: 0.6 MG/DL (ref 0.3–1.2)
BUN BLD-MCNC: 10 MG/DL (ref 9–23)
BUN/CREAT SERPL: 10.1 (ref 10–20)
CALCIUM BLD-MCNC: 9.6 MG/DL (ref 8.7–10.4)
CHLORIDE SERPL-SCNC: 103 MMOL/L (ref 98–112)
CO2 SERPL-SCNC: 30 MMOL/L (ref 21–32)
CREAT BLD-MCNC: 0.99 MG/DL
EGFRCR SERPLBLD CKD-EPI 2021: 69 ML/MIN/1.73M2 (ref 60–?)
GLOBULIN PLAS-MCNC: 2.6 G/DL (ref 2–3.5)
GLUCOSE BLD-MCNC: 104 MG/DL (ref 70–99)
OSMOLALITY SERPL CALC.SUM OF ELEC: 289 MOSM/KG (ref 275–295)
POTASSIUM SERPL-SCNC: 4.4 MMOL/L (ref 3.5–5.1)
PROT SERPL-MCNC: 7.3 G/DL (ref 5.7–8.2)
SODIUM SERPL-SCNC: 140 MMOL/L (ref 136–145)

## 2024-06-06 RX ORDER — CALCIUM CARBONATE 500 MG/1
2 TABLET, CHEWABLE ORAL 3 TIMES DAILY
Qty: 200 TABLET | Refills: 0 | Status: SHIPPED | OUTPATIENT
Start: 2024-06-06

## 2024-06-06 RX ORDER — CALCITRIOL 0.25 UG/1
0.25 CAPSULE, LIQUID FILLED ORAL DAILY
Qty: 30 CAPSULE | Refills: 0 | Status: SHIPPED | OUTPATIENT
Start: 2024-06-06

## 2024-06-06 RX ORDER — SULFAMETHOXAZOLE AND TRIMETHOPRIM 800; 160 MG/1; MG/1
1 TABLET ORAL EVERY 12 HOURS SCHEDULED
Qty: 14 TABLET | Refills: 0 | Status: SHIPPED | OUTPATIENT
Start: 2024-06-06

## 2024-06-06 RX ORDER — HYDROCODONE BITARTRATE AND ACETAMINOPHEN 7.5; 325 MG/1; MG/1
1 TABLET ORAL EVERY 6 HOURS PRN
Qty: 30 TABLET | Refills: 0 | Status: SHIPPED | OUTPATIENT
Start: 2024-06-06

## 2024-06-06 NOTE — PROGRESS NOTES
Jaquelin Hernandez is a 49 year old female.  No chief complaint on file.      HISTORY OF PRESENT ILLNESS  She presents with a history of decreased hearing.  She does have a family history of hearing loss but denies any other significant otologic signs or symptoms.  No tinnitus no dizziness no history of otosclerosis in the family.  Audiogram was performed today demonstrating a conductive hearing loss on the right which is mild in nature with normal speech discrimination scores and tympanograms.  Normal hearing on the left.  Otosclerosis?      5/3/24 she presents with recent ultrasound demonstrating enlarged thyroid nodules on the right.  TR 3's and TR 5 nodule here for further evaluation and management.  Complains of throat discomfort and has recently been looked at for allergies.  Currently on no significant medications.     5/16/24 she presents today to go over the results of her ultrasound-guided biopsies.  The TR 5 nodule demonstrated papillary carcinoma.  Here to discuss further management.  Has many questions regarding surgical management and postsurgical treatment.    6/5/24 thyroidectomy and pretracheal lymphadenectomy    6/6/24 postop day #1 status post total thyroidectomy and pretracheal lymphadenectomy.  Path currently unavailable.  Calcium 9.6 this morning.  No hyper or hypocalcemic signs or symptoms.  Voice normal voiding well ambulating without difficulty.  Tolerating a diet with slight discomfort as expected.  Typical postsurgical neck discomfort.  Drain with serosanguineous drainage.  Surgery discussed in detail with patient at the bedside today.    Social History     Socioeconomic History    Marital status: Single   Tobacco Use    Smoking status: Never    Smokeless tobacco: Never   Vaping Use    Vaping status: Never Used   Substance and Sexual Activity    Alcohol use: Never    Drug use: Never    Sexual activity: Yes     Partners: Female       Family History   Problem Relation Age of Onset     Uterine Cancer Mother 58    Breast Cancer Mother 55    Diabetes Father     Heart Disorder Maternal Grandmother         had and ASD    Prostate Cancer Maternal Grandfather     Diabetes Paternal Grandmother     Diabetes Paternal Uncle        Past Medical History:    Arthritis    Atrial septal defect (HCC)    corrected via surgery as a child at age 10.     Congenital anomaly of heart (HCC)       Past Surgical History:   Procedure Laterality Date          Hysterectomy  2017    irregular and heavy cycles.     Needle biopsy left      benign    Splint, hammer toe           REVIEW OF SYSTEMS    System Neg/Pos Details   Constitutional Negative Fatigue, fever and weight loss.   ENMT Negative Drooling.   Eyes Negative Blurred vision and vision changes.   Respiratory Negative Dyspnea and wheezing.   Cardio Negative Chest pain, irregular heartbeat/palpitations and syncope.   GI Negative Abdominal pain and diarrhea.   Endocrine Negative Cold intolerance and heat intolerance.   Neuro Negative Tremors.   Psych Negative Anxiety and depression.   Integumentary Negative Frequent skin infections, pigment change and rash.   Hema/Lymph Negative Easy bleeding and easy bruising.           PHYSICAL EXAM    /85 (BP Location: Right arm)   Pulse 85   Temp 98.2 °F (36.8 °C) (Oral)   Resp 18   Ht 5' 11\" (1.803 m)   Wt 183 lb (83 kg)   LMP 10/20/2014   SpO2 97%   BMI 25.52 kg/m²        Constitutional Normal Overall appearance - Normal.   Psychiatric Normal Orientation - Oriented to time, place, person & situation. Appropriate mood and affect.   Neck Exam Normal Inspection - Normal. Palpation - Normal. Parotid gland - Normal. Thyroid gland -surgically absent.  Dressing in place serosanguineous drainage in the JAYNE bulb   Eyes Normal Conjunctiva - Right: Normal, Left: Normal. Pupil - Right: Normal, Left: Normal. Fundus - Right: Normal, Left: Normal.   Neurological Normal Memory - Normal. Cranial nerves - Cranial nerves II  through XII grossly intact.   Head/Face Normal Facial features - Normal. Eyebrows - Normal. Skull - Normal.        Nasopharynx Normal External nose - Normal. Lips/teeth/gums - Normal. Tonsils - Normal. Oropharynx - Normal.   Ears Normal Inspection - Right: Normal, Left: Normal. Canal - Right: Normal, Left: Normal. TM - Right: Normal, Left: Normal.   Skin Normal Inspection - Normal.        Lymph Detail Normal Submental. Submandibular. Anterior cervical. Posterior cervical. Supraclavicular.        Nose/Mouth/Throat Normal External nose - Normal. Lips/teeth/gums - Normal. Tonsils - Normal. Oropharynx - Normal.   Nose/Mouth/Throat Normal Nares - Right: Normal Left: Normal. Septum -Normal  Turbinates - Right: Normal, Left: Normal.     No current outpatient medications on file.  ASSESSMENT AND PLAN    Papillary carcinoma of the thyroid    Postop day #1 status post total thyroidectomy and removal of pretracheal lymph nodes.  Doing very well voice normal slightly raspy able to tolerate a diet without difficulty other than some discomfort.  Voiding ambulating without difficulty.  Calcium 9.6 on Tums and calcitriol.  Pain controlled.  Will discharge to home and she will follow-up in the office for drain removal.  Serosanguineous drainage noted on exam today.      This note was prepared using Dragon [employer] voice recognition dictation software. As a result errors may occur. When identified these errors have been corrected. While every attempt is made to correct errors during dictation discrepancies may still exist    Dick Walters MD    6/6/2024    6:57 AM

## 2024-06-06 NOTE — ANESTHESIA POSTPROCEDURE EVALUATION
Patient: Jaquelin Hernandez    Procedure Summary       Date: 06/05/24 Room / Location: [facility] MAIN OR 03 / [facility] MAIN OR    Anesthesia Start: 1442 Anesthesia Stop: 1623    Procedure: Total thyroidectomy (Neck) Diagnosis:       Thyroid cancer (HCC)      (Thyroid cancer (HCC) [C73])    Surgeons: Dick Walters MD Anesthesiologist: Costa Figueredo MD    Anesthesia Type: general ASA Status: 2            Anesthesia Type: general    Vitals Value Taken Time   /85 06/06/24 0412   Temp 98.24 °F (36.8 °C) 06/06/24 0412   Pulse 77 06/06/24 0412   Resp 18 06/06/24 0412   SpO2 97 % 06/06/24 0412   Vitals shown include unfiled device data.    EM AN Post Evaluation:   Patient Evaluated in PACU  Patient Participation: complete - patient participated  Level of Consciousness: awake and alert  Pain Score: 2  Pain Management: satisfactory to patient  Airway Patency:  Dental exam unchanged from preop  Yes    Nausea/Vomiting: none  Cardiovascular Status: blood pressure returned to baseline and hemodynamically stable  Respiratory Status: acceptable, nasal cannula, nonlabored ventilation and unassisted  Postoperative Hydration acceptable      Costa Figueredo MD  6/6/2024 6:47 AM

## 2024-06-06 NOTE — OPERATIVE REPORT
Preoperative diagnosis: Papillary carcinoma of the thyroid    Postoperative diagnosis: Same    Procedure: #1 total thyroidectomy #2 removal of pretracheal lymph nodes    Surgeon: Dick Walters MD    Assistant Baldemar Recio MD    EBL: 10 mL    Date of service: 6/5/2024    Anesthesia: General endotracheal anesthesia    Indications: Patient presents with a history of TR 5 nodule of the right thyroid with biopsy demonstrating papillary carcinoma.  Total thyroidectomy is indicated    Procedure: Patient was taken to the operating room placed in supine position following the induction of general endotracheal anesthesia examination demonstrated a palpable mass in the right thyroid.  The head was extended a shoulder roll placed.  A natural skin line was identified and marked in a curvilinear fashion and infiltrated with 1% Xylocaine with 1-100,000 of epinephrine.  Patient was then prepped and draped regular fashion receiving antibiotics and steroids perioperatively.  A 15 blade was used to incise the skin along for visualization of the platysma which was divided bilaterally.  This allowed for the elevation of skin muscle flaps down to the level of the sternal notch and superiorly to the thyroid notch.  Muscles were then divided at the midline.  The left hemithyroid was first addressed with the strap muscles lateralized and identification of the inferior pole of the thyroid.  This was carefully dissected free from surrounding fibrofatty tissues and its blood supply clamped and divided at the capsule of the thyroid.  The gland was then elevated from inferior to superior with identification preservation of inferior and superior parathyroid gland.  The recurrent laryngeal nerve was noted within the the tracheoesophageal groove and preserved.  The gland was carefully elevated off the surface of the trachea and the foraminal eminence was removed as well using bipolar cautery and hemostat dissection.  The right side of the thyroid  was then addressed and unfortunately the gland was noted to be scarred down to surrounding strap musculature and other fibrofatty tissues.  The gland was very carefully dissected free from surrounding tissues with removal of adherent strap musculature which was left on the surface of the thyroid.  Deep in the neck the thyroid is noted to extend down to the level of the esophagus and into the tracheoesophageal groove.  The gland was carefully dissected superiorly from surrounding tissues and noted to be somewhat atypical in appearance although no gross extension of tumor could be noted into the surrounding tissues the gland was but surrounding superior pole vasculature which was clamped and divided at the capsule as well the gland was then elevated superior to inferior creating a plane which was now deemed to be somewhat more normal.  Inferiorly the vascular pedicle was identified as well and noted to be within a significant amount of scar tissue this was carefully clamped and divided as well and in doing so there was noted to be some pretracheal lymph nodes which were removed down to the level of the sternal notch and the tracheal surface.  This was sent in formalin to pathology for evaluation.  The gland was then carefully elevated off the surface of the trach with identification of what appeared to be the recurrent laryngeal nerve which was adherent to the gland itself on the posterior aspect of the gland.  With significant care and using Blevins dissectors was carefully elevated off of the surface of the thyroid and the entire nerve was spared throughout the tracheoesophageal groove.  Caution the gland was elevated off the surface of the trachea at the cricothyroid joint with preservation of the nerve as it entered into the larynx.  Inferiorly parathyroid was identified as was superiorly and both were preserved.  The gland was then carefully elevated off the surface of the trachea completely marked with a stitch  and sent to pathology for further evaluation.  Any bleeding sites were controlled using bipolar cautery.  Valsalva was performed up to 40 cm water pressure with full times with no significant bleeding noted.  A 7 mm flat drain was placed within the wound and the strap muscles were then approximated and closed using 3-0 Chromic Gut suture.  The tissues of the skin were then approximated and closed using a series of Chromic Gut suture as well.  Skin edges were approximated everted and closed using a running subcuticular 4-0 Prolene stitch followed by placement of a tape and benzoin dressing.  A large bulky Liu and style dressing was then placed over the neck and the drain was placed to JAYNE suction.  The patient was subsidy awakened extubated and taken to recovery without any identifiable complications.  EBL less than 10 mL.

## 2024-06-07 ENCOUNTER — TELEPHONE (OUTPATIENT)
Dept: OTOLARYNGOLOGY | Facility: CLINIC | Age: 49
End: 2024-06-07

## 2024-06-07 NOTE — TELEPHONE ENCOUNTER
Spoke with patient, patient stated drain output has been less then 20cc. On 6/6/24 output was about 20cc, today (6/7/24) is less than 20cc. Output is pink tinged.     Future Appointments   Date Time Provider Department Center   6/8/2024  9:10 AM Dick Walters MD [facility]   6/13/2024 10:30 AM Dick Walters MD [facility]

## 2024-06-07 NOTE — TELEPHONE ENCOUNTER
Per patient Dr. Walters informed her that someone would call her today to check on her drain. Please advise

## 2024-06-08 ENCOUNTER — OFFICE VISIT (OUTPATIENT)
Dept: OTOLARYNGOLOGY | Facility: CLINIC | Age: 49
End: 2024-06-08
Payer: COMMERCIAL

## 2024-06-08 DIAGNOSIS — C73 THYROID CANCER (HCC): Primary | ICD-10-CM

## 2024-06-08 PROCEDURE — 99024 POSTOP FOLLOW-UP VISIT: CPT | Performed by: OTOLARYNGOLOGY

## 2024-06-08 NOTE — PROGRESS NOTES
Jaquelin Hernandez is a 49 year old female.    Chief Complaint   Patient presents with    Post-Op     Patient is here due to Total thyroidectomy post op and drain removal        HISTORY OF PRESENT ILLNESS  She presents with a history of decreased hearing.  She does have a family history of hearing loss but denies any other significant otologic signs or symptoms.  No tinnitus no dizziness no history of otosclerosis in the family.  Audiogram was performed today demonstrating a conductive hearing loss on the right which is mild in nature with normal speech discrimination scores and tympanograms.  Normal hearing on the left.  Otosclerosis?      5/3/24 she presents with recent ultrasound demonstrating enlarged thyroid nodules on the right.  TR 3's and TR 5 nodule here for further evaluation and management.  Complains of throat discomfort and has recently been looked at for allergies.  Currently on no significant medications.     5/16/24 she presents today to go over the results of her ultrasound-guided biopsies.  The TR 5 nodule demonstrated papillary carcinoma.  Here to discuss further management.  Has many questions regarding surgical management and postsurgical treatment.     6/5/24 thyroidectomy and pretracheal lymphadenectomy     6/6/24 postop day #1 status post total thyroidectomy and pretracheal lymphadenectomy.  Path currently unavailable.  Calcium 9.6 this morning.  No hyper or hypocalcemic signs or symptoms.  Voice normal voiding well ambulating without difficulty.  Tolerating a diet with slight discomfort as expected.  Typical postsurgical neck discomfort.  Drain with serosanguineous drainage.  Surgery discussed in detail with patient at the bedside today.     6/8/24 doing very well he is postop day #3 status post total thyroidectomy and pretracheal lymphadenectomy for removal of her drain voice normal some neck discomfort otherwise doing well.      Social History     Socioeconomic History    Marital status:  Single   Tobacco Use    Smoking status: Never    Smokeless tobacco: Never   Vaping Use    Vaping status: Never Used   Substance and Sexual Activity    Alcohol use: Never    Drug use: Never    Sexual activity: Yes     Partners: Female       Family History   Problem Relation Age of Onset    Uterine Cancer Mother 58    Breast Cancer Mother 55    Diabetes Father     Heart Disorder Maternal Grandmother         had and ASD    Prostate Cancer Maternal Grandfather     Diabetes Paternal Grandmother     Diabetes Paternal Uncle        Past Medical History:    Arthritis    Atrial septal defect (HCC)    corrected via surgery as a child at age 10.     Congenital anomaly of heart (HCC)       Past Surgical History:   Procedure Laterality Date          Hysterectomy  2017    irregular and heavy cycles.     Needle biopsy left      benign    Splint, hammer toe           REVIEW OF SYSTEMS    System Neg/Pos Details   Constitutional Negative Fatigue, fever and weight loss.   ENMT Negative Drooling.   Eyes Negative Blurred vision and vision changes.   Respiratory Negative Dyspnea and wheezing.   Cardio Negative Chest pain, irregular heartbeat/palpitations and syncope.   GI Negative Abdominal pain and diarrhea.   Endocrine Negative Cold intolerance and heat intolerance.   Neuro Negative Tremors.   Psych Negative Anxiety and depression.   Integumentary Negative Frequent skin infections, pigment change and rash.   Hema/Lymph Negative Easy bleeding and easy bruising.           PHYSICAL EXAM    LMP 10/20/2014        Constitutional Normal Overall appearance - Normal.   Psychiatric Normal Orientation - Oriented to time, place, person & situation. Appropriate mood and affect.   Neck Exam Normal Inspection - Normal. Palpation - Normal. Parotid gland - Normal. Thyroid gland - Normal.   Eyes Normal Conjunctiva - Right: Normal, Left: Normal. Pupil - Right: Normal, Left: Normal. Fundus - Right: Normal, Left: Normal.   Neurological Normal  Memory - Normal. Cranial nerves - Cranial nerves II through XII grossly intact.   Head/Face Normal Facial features - Normal. Eyebrows - Normal. Skull - Normal.        Nasopharynx Normal External nose - Normal. Lips/teeth/gums - Normal. Tonsils - Normal. Oropharynx - Normal.   Ears Normal Inspection - Right: Normal, Left: Normal. Canal - Right: Normal, Left: Normal. TM - Right: Normal, Left: Normal.   Skin Normal Inspection - Normal.        Lymph Detail Normal Submental. Submandibular. Anterior cervical. Posterior cervical. Supraclavicular.   Incision  Dressing in place intact   Nose/Mouth/Throat Normal External nose - Normal. Lips/teeth/gums - Normal. Tonsils - Normal. Oropharynx - Normal.   Nose/Mouth/Throat Normal Nares - Right: Normal Left: Normal. Septum -Normal  Turbinates - Right: Normal, Left: Normal.       Current Outpatient Medications:     calcitriol 0.25 MCG Oral Cap, Take 1 capsule (0.25 mcg total) by mouth daily., Disp: 30 capsule, Rfl: 0    calcium carbonate 500 MG Oral Chew Tab, Chew 2 tablets (1,000 mg total) by mouth 3 (three) times daily., Disp: 200 tablet, Rfl: 0    sulfamethoxazole-trimethoprim -160 MG Oral Tab per tablet, Take 1 tablet by mouth every 12 (twelve) hours., Disp: 14 tablet, Rfl: 0    HYDROcodone-acetaminophen (NORCO) 7.5-325 MG Oral Tab, Take 1 tablet by mouth every 6 (six) hours as needed., Disp: 30 tablet, Rfl: 0    Loratadine-Pseudoephedrine (CLARITIN-D 12 HOUR OR), Take by mouth., Disp: , Rfl:     acetaminophen 500 MG Oral Tab, Take 1 tablet (500 mg total) by mouth every 6 (six) hours as needed for Pain., Disp: , Rfl:     montelukast 10 MG Oral Tab, Take 1 tablet (10 mg total) by mouth nightly., Disp: 30 tablet, Rfl: 3  ASSESSMENT AND PLAN    1. Thyroid cancer (HCC)  Drain removed no complaints no concerns doing very well.  Path results not available at this time.  In progress.  Return to see me next week for removal of her dressing and stitch        This note was prepared  using Dragon [employer] voice recognition dictation software. As a result errors may occur. When identified these errors have been corrected. While every attempt is made to correct errors during dictation discrepancies may still exist    Dick Walters MD    6/8/2024    10:22 AM

## 2024-06-13 ENCOUNTER — TELEPHONE (OUTPATIENT)
Dept: ENDOCRINOLOGY CLINIC | Facility: CLINIC | Age: 49
End: 2024-06-13

## 2024-06-13 ENCOUNTER — OFFICE VISIT (OUTPATIENT)
Dept: OTOLARYNGOLOGY | Facility: CLINIC | Age: 49
End: 2024-06-13
Payer: COMMERCIAL

## 2024-06-13 DIAGNOSIS — C73 THYROID CANCER (HCC): Primary | ICD-10-CM

## 2024-06-13 PROCEDURE — 99024 POSTOP FOLLOW-UP VISIT: CPT | Performed by: OTOLARYNGOLOGY

## 2024-06-13 NOTE — PROGRESS NOTES
Jaquelin Hernandez is a 49 year old female.    Chief Complaint   Patient presents with    Post-Op     Post-op: Total Thyroidectomy done on 6/5/24       HISTORY OF PRESENT ILLNESS    She presents with a history of decreased hearing.  She does have a family history of hearing loss but denies any other significant otologic signs or symptoms.  No tinnitus no dizziness no history of otosclerosis in the family.  Audiogram was performed today demonstrating a conductive hearing loss on the right which is mild in nature with normal speech discrimination scores and tympanograms.  Normal hearing on the left.  Otosclerosis?      5/3/24 she presents with recent ultrasound demonstrating enlarged thyroid nodules on the right.  TR 3's and TR 5 nodule here for further evaluation and management.  Complains of throat discomfort and has recently been looked at for allergies.  Currently on no significant medications.     5/16/24 she presents today to go over the results of her ultrasound-guided biopsies.  The TR 5 nodule demonstrated papillary carcinoma.  Here to discuss further management.  Has many questions regarding surgical management and postsurgical treatment.     6/5/24 thyroidectomy and pretracheal lymphadenectomy     6/6/24 postop day #1 status post total thyroidectomy and pretracheal lymphadenectomy.  Path currently unavailable.  Calcium 9.6 this morning.  No hyper or hypocalcemic signs or symptoms.  Voice normal voiding well ambulating without difficulty.  Tolerating a diet with slight discomfort as expected.  Typical postsurgical neck discomfort.  Drain with serosanguineous drainage.  Surgery discussed in detail with patient at the bedside today.     6/8/24 doing very well he is postop day #3 status post total thyroidectomy and pretracheal lymphadenectomy for removal of her drain voice normal some neck discomfort otherwise doing well.    6/13/24   Final Diagnosis:      A. Pretracheal fat; excision:   Fatty tissue with one  lymph node, negative for metastatic carcinoma (0/1).     B.  Thyroid; total thyroidectomy:  Multifocal papillary thyroid carcinoma (1.1 cm in greatest dimension), arising in a background of benign follicular nodule, follicular hyperplasia and lymphocytic thyroiditis.  One parathyroid gland involved by the papillary thyroid carcinoma.  Negative inked surgical resection margins.  Prior procedure site changes are identified.  One lymph node, negative for metastatic carcinoma (0/1).  Pathological stage classification (pTNM, AJCC 8th edition): pT1b pN0.        8 days out from total thyroidectomy.  Doing very well no complaints or concerns.  Here to discuss further management.  Only concern is perhaps some fullness when swallowing which is worsened after removal of her drain.  Otherwise reviewed with patient in the office today.    Social History     Socioeconomic History    Marital status: Single   Tobacco Use    Smoking status: Never    Smokeless tobacco: Never   Vaping Use    Vaping status: Never Used   Substance and Sexual Activity    Alcohol use: Never    Drug use: Never    Sexual activity: Yes     Partners: Female       Family History   Problem Relation Age of Onset    Uterine Cancer Mother 58    Breast Cancer Mother 55    Diabetes Father     Heart Disorder Maternal Grandmother         had and ASD    Prostate Cancer Maternal Grandfather     Diabetes Paternal Grandmother     Diabetes Paternal Uncle        Past Medical History:    Arthritis    Atrial septal defect (HCC)    corrected via surgery as a child at age 10.     Congenital anomaly of heart (HCC)       Past Surgical History:   Procedure Laterality Date          Hysterectomy  2017    irregular and heavy cycles.     Needle biopsy left      benign    Splint, hammer toe           REVIEW OF SYSTEMS    System Neg/Pos Details   Constitutional Negative Fatigue, fever and weight loss.   ENMT Negative Drooling.   Eyes Negative Blurred vision and vision changes.    Respiratory Negative Dyspnea and wheezing.   Cardio Negative Chest pain, irregular heartbeat/palpitations and syncope.   GI Negative Abdominal pain and diarrhea.   Endocrine Negative Cold intolerance and heat intolerance.   Neuro Negative Tremors.   Psych Negative Anxiety and depression.   Integumentary Negative Frequent skin infections, pigment change and rash.   Hema/Lymph Negative Easy bleeding and easy bruising.           PHYSICAL EXAM    [facility] 10/20/2014        Constitutional Normal Overall appearance - Normal.   Psychiatric Normal Orientation - Oriented to time, place, person & situation. Appropriate mood and affect.   Neck Exam Normal Inspection - Normal. Palpation - Normal. Parotid gland - Normal. Thyroid gland - Normal.   Eyes Normal Conjunctiva - Right: Normal, Left: Normal. Pupil - Right: Normal, Left: Normal. Fundus - Right: Normal, Left: Normal.   Neurological Normal Memory - Normal. Cranial nerves - Cranial nerves II through XII grossly intact.   Head/Face Normal Facial features - Normal. Eyebrows - Normal. Skull - Normal.        Nasopharynx Normal External nose - Normal. Lips/teeth/gums - Normal. Tonsils - Normal. Oropharynx - Normal.   Ears Normal Inspection - Right: Normal, Left: Normal. Canal - Right: Normal, Left: Normal. TM - Right: Normal, Left: Normal.   Skin Normal Inspection - Normal.        Lymph Detail Normal Submental. Submandibular. Anterior cervical. Posterior cervical. Supraclavicular.   Incision  Clean dry and intact   Nose/Mouth/Throat Normal External nose - Normal. Lips/teeth/gums - Normal. Tonsils - Normal. Oropharynx - Normal.   Nose/Mouth/Throat Normal Nares - Right: Normal Left: Normal. Septum -Normal  Turbinates - Right: Normal, Left: Normal.       Current Outpatient Medications:     calcitriol 0.25 MCG Oral Cap, Take 1 capsule (0.25 mcg total) by mouth daily., Disp: 30 capsule, Rfl: 0    calcium carbonate 500 MG Oral Chew Tab, Chew 2 tablets (1,000 mg total) by mouth 3 (three)  times daily., Disp: 200 tablet, Rfl: 0    HYDROcodone-acetaminophen (NORCO) 7.5-325 MG Oral Tab, Take 1 tablet by mouth every 6 (six) hours as needed., Disp: 30 tablet, Rfl: 0    Loratadine-Pseudoephedrine (CLARITIN-D 12 HOUR OR), Take by mouth., Disp: , Rfl:     acetaminophen 500 MG Oral Tab, Take 1 tablet (500 mg total) by mouth every 6 (six) hours as needed for Pain., Disp: , Rfl:     sulfamethoxazole-trimethoprim -160 MG Oral Tab per tablet, Take 1 tablet by mouth every 12 (twelve) hours., Disp: 14 tablet, Rfl: 0    montelukast 10 MG Oral Tab, Take 1 tablet (10 mg total) by mouth nightly., Disp: 30 tablet, Rfl: 3  ASSESSMENT AND PLAN    1. Thyroid cancer (HCC)  Path reviewed with patient doing very well no real complaints or concerns other than some issues related to swallowing which are most likely typical postsurgical changes which will improve with time.  I did go over management in the future including need to start Synthroid after her nuclear treatment with reactive iodine.  I did give her a low iodine and diet sheet and the number for the nuclear medicine physicians to schedule her whole-body iodine scan.  I will contact her endocrinologist to see if they can get her in for further management of her postsurgical hypothyroidism and management of her papillary carcinoma.  Return to see me in 3 months        This note was prepared using Dragon Medical voice recognition dictation software. As a result errors may occur. When identified these errors have been corrected. While every attempt is made to correct errors during dictation discrepancies may still exist    Dick Walters MD    6/13/2024    1:15 PM

## 2024-06-14 ENCOUNTER — TELEPHONE (OUTPATIENT)
Dept: OTOLARYNGOLOGY | Facility: CLINIC | Age: 49
End: 2024-06-14

## 2024-06-14 DIAGNOSIS — C73 THYROID CANCER (HCC): Primary | ICD-10-CM

## 2024-06-14 DIAGNOSIS — C73 THYROID CA (HCC): Primary | ICD-10-CM

## 2024-06-14 NOTE — TELEPHONE ENCOUNTER
Please add on with Dr. Obrien in the next 3-4 weeks  If no availability, please let me know Thanks

## 2024-06-14 NOTE — TELEPHONE ENCOUNTER
Received call from [facility] for nuclear med studies to be ordered for patient. Orders entered as requested for NM I-131 post ablation, NM I-131 therapy, NM I-131 whole body scan wit uptake. Paige from [place]  will schedule.    Ira team--please start working on PA ASAP for these tests, thank you.

## 2024-06-25 ENCOUNTER — LAB ENCOUNTER (OUTPATIENT)
Dept: LAB | Age: 49
End: 2024-06-25
Attending: INTERNAL MEDICINE

## 2024-06-25 ENCOUNTER — OFFICE VISIT (OUTPATIENT)
Facility: LOCATION | Age: 49
End: 2024-06-25

## 2024-06-25 VITALS
HEIGHT: 71 IN | BODY MASS INDEX: 26.57 KG/M2 | SYSTOLIC BLOOD PRESSURE: 106 MMHG | WEIGHT: 189.81 LBS | HEART RATE: 67 BPM | DIASTOLIC BLOOD PRESSURE: 60 MMHG | OXYGEN SATURATION: 99 %

## 2024-06-25 DIAGNOSIS — E89.0 HYPOTHYROIDISM, POSTSURGICAL: ICD-10-CM

## 2024-06-25 DIAGNOSIS — C73 THYROID CANCER (HCC): Primary | ICD-10-CM

## 2024-06-25 DIAGNOSIS — C73 THYROID CANCER (HCC): ICD-10-CM

## 2024-06-25 LAB
ALBUMIN SERPL-MCNC: 5 G/DL (ref 3.2–4.8)
ALBUMIN/GLOB SERPL: 1.9 {RATIO} (ref 1–2)
ALP LIVER SERPL-CCNC: 74 U/L
ALT SERPL-CCNC: 34 U/L
ANION GAP SERPL CALC-SCNC: 5 MMOL/L (ref 0–18)
AST SERPL-CCNC: 41 U/L (ref ?–34)
BILIRUB SERPL-MCNC: 0.5 MG/DL (ref 0.3–1.2)
BUN BLD-MCNC: 17 MG/DL (ref 9–23)
BUN/CREAT SERPL: 14.9 (ref 10–20)
CALCIUM BLD-MCNC: 10.1 MG/DL (ref 8.7–10.4)
CHLORIDE SERPL-SCNC: 102 MMOL/L (ref 98–112)
CO2 SERPL-SCNC: 32 MMOL/L (ref 21–32)
CREAT BLD-MCNC: 1.14 MG/DL
EGFRCR SERPLBLD CKD-EPI 2021: 58 ML/MIN/1.73M2 (ref 60–?)
FASTING STATUS PATIENT QL REPORTED: NO
GLOBULIN PLAS-MCNC: 2.7 G/DL (ref 2–3.5)
GLUCOSE BLD-MCNC: 84 MG/DL (ref 70–99)
OSMOLALITY SERPL CALC.SUM OF ELEC: 289 MOSM/KG (ref 275–295)
POTASSIUM SERPL-SCNC: 4.4 MMOL/L (ref 3.5–5.1)
PROT SERPL-MCNC: 7.7 G/DL (ref 5.7–8.2)
SODIUM SERPL-SCNC: 139 MMOL/L (ref 136–145)
T4 FREE SERPL-MCNC: 0.4 NG/DL (ref 0.8–1.7)
TSI SER-ACNC: 83.32 MIU/ML (ref 0.55–4.78)

## 2024-06-25 PROCEDURE — 80053 COMPREHEN METABOLIC PANEL: CPT

## 2024-06-25 PROCEDURE — 3008F BODY MASS INDEX DOCD: CPT | Performed by: INTERNAL MEDICINE

## 2024-06-25 PROCEDURE — 99245 OFF/OP CONSLTJ NEW/EST HI 55: CPT | Performed by: INTERNAL MEDICINE

## 2024-06-25 PROCEDURE — 84432 ASSAY OF THYROGLOBULIN: CPT

## 2024-06-25 PROCEDURE — 3078F DIAST BP <80 MM HG: CPT | Performed by: INTERNAL MEDICINE

## 2024-06-25 PROCEDURE — 36415 COLL VENOUS BLD VENIPUNCTURE: CPT

## 2024-06-25 PROCEDURE — 84439 ASSAY OF FREE THYROXINE: CPT

## 2024-06-25 PROCEDURE — 86800 THYROGLOBULIN ANTIBODY: CPT

## 2024-06-25 PROCEDURE — 84443 ASSAY THYROID STIM HORMONE: CPT

## 2024-06-25 PROCEDURE — 3074F SYST BP LT 130 MM HG: CPT | Performed by: INTERNAL MEDICINE

## 2024-06-25 RX ORDER — LEVOTHYROXINE SODIUM 0.15 MG/1
150 TABLET ORAL
Qty: 90 TABLET | Refills: 0 | Status: SHIPPED | OUTPATIENT
Start: 2024-06-25

## 2024-06-25 NOTE — PROGRESS NOTES
New Patient Evaluation - History and Physical    CONSULT - Reason for Visit:    thyroid cancer s/p surgery   Requesting Physician:   Mariam Lopez DO    CHIEF COMPLAINT:    Chief Complaint   Patient presents with    Consult     For thyroid problems.  Patient had thyroidectomy due to thyroid cancer, done at [facility] Dr. Romeo MD was surgeon and patient was referred by Dr. Walters        HISTORY OF PRESENT ILLNESS:   Jaquelin Hernandez is a 49 year old female who presents with  PTC s/p surgery on 6/5/2024  Stage I, pT1b pN0.  Multifocal classic PTC, BL, largest 1.1 CM with microscopic ETE       She had ASD repair in the past   PCP did pre op w/u for knee surgery and found to have thyroid nodule   Post op,  0.25 mcg calcitriol daily and ca carbonate 1000 mg tid    Voice is still not the same pre-op  No dysphagia   Plan is to get JOSE so she is not onLT4 now   7/10/2024 JOSE scan   7/12/2024 JOSE dose     Patho showed           FNA 5/10/2024  Right inferior/medial thyroid nodule-Cytologic features consistent with papillary thyroid carcinoma (Ellis 6    6/5/2024 pathology   Thyroid; total thyroidectomy:  Multifocal papillary thyroid carcinoma (1.1 cm in greatest dimension), arising in a background of benign follicular nodule, follicular hyperplasia and lymphocytic thyroiditis.  One parathyroid gland involved by the papillary thyroid carcinoma.  Negative inked surgical resection margins.  Prior procedure site changes are identified.  One lymph node, negative for metastatic carcinoma (0/1).  Pathological stage classification (pTNM, AJCC 8th edition): pT1b pN0.        Electronically signed by Yvonne Quinones MD on 6/11/2024 at  2:24 PM        Final Diagnosis Comment      For  purposes, this case was reviewed by a second pathologist who concurred with the diagnosis.        Synoptic Report     THYROID GLAND   8th Edition - Protocol posted: 3/22/2023THYROID GLAND: RESECTION - All Specimens  SPECIMEN    Procedure  Total thyroidectomy   TUMOR   Tumor Focality  Multifocal   Tumor Characteristics     Tumor Site  Right lobe     Left lobe   Tumor Size  Greatest Dimension (Centimeters): 1.1 cm   Histologic Tumor Types and Subtypes  Papillary carcinoma, classic subtype   Tumor Proliferative Activity     Mitotic Rate  Less than 3 mitoses per 2mm2   Tumor Necrosis  Not identified   Angioinvasion (vascular invasion)  Not identified   Lymphatic Invasion  Not identified   Extrathyroidal Extension  Present, microscopic strap muscle invasion only, with no clinical / macroscopic evidence of invasion   Margin Status  All margins negative for carcinoma   Distance from Invasive Carcinoma to Closest Margin  At least: 1  mm   REGIONAL LYMPH NODES   Regional Lymph Node Status  All regional lymph nodes negative for tumor   Number of Lymph Nodes Examined  2   Nata Level(s) Examined  Level VI   pTNM CLASSIFICATION (AJCC 8th Edition)   Reporting of pT, pN, and (when applicable) pM categories is based on information available to the pathologist at the time the report is issued. As per the AJCC (Chapter 1, 8th Ed.) it is the managing physician’s responsibility to establish the final pathologic stage based upon all pertinent information, including but potentially not limited to this pathology report.        pT Category  pT1b   pN Category  pN0   .            ASSESSMENT AND PLAN:  49 year old female who presents with  PTC s/p surgery on 6/5/2024  Stage I, pT1b pN0.  Patho showed Multifocal classic PTC, BL, largest 1.1 CM with microscopic ETE  Not on LT4 to get JOSE after withdrawal   Still on ca and calcitriol so will do labs and assess today     Plan  Labs today - might stop calcitriol but will continue calcium 500 mg twice a day   Not on LT4 now  On low iodine diet   JOSE iodine in July and will get post tx scan   Start levothyroxine 1 week post  JOSE treatment  Labs and RTC in 6 weeks post JOSE treatment     Thyroid medication dose:  Levothyroxine 150 mcg /day   Take you medication on empty stomach, not with any other medication or food. Wait 60 minutes before eating. Wait 4 hours before taking Vitamins, Calcium or iron. On the morning of the lab test, please take the medication after the blood test not before. Do not take Biotin 1 week before blood test.      This is  very helpful website   [email or web address]  [email or web address]    Don’t take your thyroid hormone at the same time as:  Walnuts.  Soybean flour.  Cottonseed meal.  Iron supplements or multivitamins containing iron.  Calcium supplements.  Antacids that contain aluminum, magnesium or calcium.  Some ulcer medicines, such as sucralfate (Carafate).  Some cholesterol-lowering drugs, such as those containing cholestyramine (Prevalite, Locholest) and colestipol (Colestid).  To avoid possible problems, eat these foods or use these products several hours before or after you take your thyroid medicine.    Supplements containing biotin, common in hair and nail products, can make it hard to measure how much thyroid hormone is in the body. Biotin does not affect thyroid hormone levels. But supplements that have biotin should be stopped for at least a week before measuring thyroid function so that the measurement is correct.        PAST MEDICAL HISTORY:   Past Medical History:    Arthritis    Atrial septal defect (HCC)    corrected via surgery as a child at age 10.     Congenital anomaly of heart (HCC)   PTC s/p surgery in 2024      PAST SURGICAL HISTORY:   Past Surgical History:   Procedure Laterality Date          Hysterectomy      irregular and heavy cycles.     Needle biopsy left      benign    Splint, hammer toe        CURRENT MEDICATIONS:     levothyroxine 150 MCG Oral Tab Take 1 tablet (150 mcg total) by mouth before breakfast. 90 tablet 0    calcitriol 0.25 MCG Oral Cap Take 1 capsule (0.25 mcg total) by mouth daily. 30 capsule 0    calcium  carbonate 500 MG Oral Chew Tab Chew 2 tablets (1,000 mg total) by mouth 3 (three) times daily. 200 tablet 0    Loratadine-Pseudoephedrine (CLARITIN-D 12 HOUR OR) Take by mouth.        ALLERGIES:  Allergies   Allergen Reactions    Erythromycin NAUSEA AND VOMITING and OTHER (SEE COMMENTS)     Reactions: migraines, vomiting.   migraines      Penicillins ANAPHYLAXIS and UNKNOWN     NO SKIN BLISTERING  States her mother told her she almost  as an infant when she received pcn      SOCIAL HISTORY:    Social History     Socioeconomic History    Marital status: Single   Tobacco Use    Smoking status: Never    Smokeless tobacco: Never   Vaping Use    Vaping status: Never Used   Substance and Sexual Activity    Alcohol use: Never    Drug use: Never    Sexual activity: Yes     Partners: Female   Used to be    Smoking: no  Marijuana:  no  Etoh: no  Drugs:   no  FAMILY HISTORY:   Family History   Problem Relation Age of Onset    Uterine Cancer Mother 58    Breast Cancer Mother 55    Diabetes Father     Heart Disorder Maternal Grandmother         had and ASD    Prostate Cancer Maternal Grandfather     Diabetes Paternal Grandmother     Diabetes Paternal Uncle       REVIEW OF SYSTEMS:  All negative other than HPI    PHYSICAL EXAM:   Height: 5' 11\" (180.3 cm) (1329)  Weight: 189 lb 12.8 oz (86.1 kg) (1329)  BSA (Calculated - sq m): 2.06 sq meters (1329)  Pulse: 67 (1329)  BP: 106/60 (1329)  Temp: --  Do Not Use - Resp Rate: --  SpO2: 99 % (1329)    Body mass index is 26.47 kg/m².    CONSTITUTIONAL:  Awake and alert. Age appropriate, good hygiene not in acute distress. Well-nourished and well developed. no acute distress   PSYCH:   Orientated to time, place, person & situation, Normal mood and affect, memory intact, normal insight and judgment, cooperative  Neuro: speech is clear. Awake, alert, no aphasia, no facial asymmetry, no nuchal rigidity  EYES:  No proptosis, no ptosis,  conjunctiva normal  ENT:  Normocephalic, atraumatic  Eye: EOMI, normal lids, no discharge, no conjunctival erythema. No exophthalmos/proptosis, Ptosis negative   No rhinorrhea, moist oral mucosa  Neck: full range of motion  Neck/Thyroid: neck inspection:    scar,    LUNGS:  No acute respiratory distress, non-labored respiration. Speaking full sentences  CARDIOVASCULAR:  regular rate   ABDOMEN:  No abdominal pain.   SKIN:  no bruising or bleeding, no rashes and no lesions, Skin is dry, no obvious rashes or lesions  EXTREMITIES: no gross abnormality   MSK: Moves extremities spontaneously. full range of motion in all major joints      DATA:     Pertinent data reviewed      Latest Reference Range & Units 06/06/24 05:25   CALCIUM 8.7 - 10.4 mg/dL 9.6      Latest Reference Range & Units 05/03/24 16:20   T4,Free (Direct) 0.8 - 1.7 ng/dL 1.3   TSH 0.550 - 4.780 mIU/mL 2.844   T3 FREE 2.40 - 4.20 pg/mL 2.84     US thyroid FNA 5/2024 CONCLUSION:  Uneventful ultrasound guided FNA.  The patient was instructed to obtain follow up care and FNA results from the referring physician            No results for input(s): \"TSH\", \"T4F\", \"T3F\", \"THYP\" in the last 72 hours.  No results found.    Orders Placed This Encounter   Procedures    TSH W Reflex To Free T4    Thyroglobulin Antibody and Tumor Marker    TSH W Reflex To Free T4    Thyroglobulin Antibody and Tumor Marker    Comp Metabolic Panel [E]     Orders Placed This Encounter    TSH W Reflex To Free T4     Standing Status:   Future     Number of Occurrences:   1     Standing Expiration Date:   6/25/2025     Order Specific Question:   Release to patient     Answer:   Immediate    Thyroglobulin Antibody and Tumor Marker     Standing Status:   Future     Number of Occurrences:   1     Standing Expiration Date:   6/25/2025     Order Specific Question:   Release to patient     Answer:   Immediate    TSH W Reflex To Free T4     Standing Status:   Future     Standing Expiration Date:    6/25/2025     Order Specific Question:   Release to patient     Answer:   Immediate    Thyroglobulin Antibody and Tumor Marker     Standing Status:   Future     Standing Expiration Date:   6/25/2025     Order Specific Question:   Release to patient     Answer:   Immediate    Comp Metabolic Panel [E]     Standing Status:   Future     Number of Occurrences:   1     Standing Expiration Date:   6/25/2025     Order Specific Question:   Release to patient     Answer:   Immediate    levothyroxine 150 MCG Oral Tab     Sig: Take 1 tablet (150 mcg total) by mouth before breakfast.     Dispense:  90 tablet     Refill:  0          This is a specialized patient consultation in endocrinology and required comprehensive review of prior records, as well as current evaluation, with time required for consideration of complex endocrine issues and consultation. For this visit, I personally interviewed the patient, and family member if accompanied, performed the pertinent parts of the history and physical examination. ROS included screening for appropriate endocrine conditions.   Today's diagnosis and plan were reviewed in detail with the patient who states understanding and agrees with plan. I discussed with the patient possible diagnosis, differential diagnosis, need for work up, treatment options, alternatives and side effects.     Please see note for details about time spent which includes:   · pre-visit preparation  · reviewing records  · face to face time with the patient   · timely documentation of the encounter  · ordering medications/tests  · communication with care team  · care coordination    I appreciate the opportunity to be part of your patient's medical care and will keep you, as the referring and primary physicians, informed about the care of your patient. Please feel free to contact me should you have any questions.    The 21st Century Cures Act makes medical notes like these available to patients in the interest of  transparency. Please be advised this is a medical document. Medical documents are intended to carry relevant information, facts as evident, and the clinical opinion of the practitioner. The medical note is intended as peer to peer communication and may appear blunt or direct. It is written in medical language and may contain abbreviations or verbiage that are unfamiliar.   Jl Obrien MD

## 2024-06-25 NOTE — PATIENT INSTRUCTIONS
Labs today - might stop calcitriol but will continue calcium 500 mg twice a day   Not on LT4 now  On low iodine diet   JOSE iodine in July and will get post tx scan   Start levothyroxine 1 week post  JOSE treatment  Labs and RTC in 6 weeks post JOSE treatment     Thyroid medication dose: Levothyroxine 150 mcg /day   Take you medication on empty stomach, not with any other medication or food. Wait 60 minutes before eating. Wait 4 hours before taking Vitamins, Calcium or iron. On the morning of the lab test, please take the medication after the blood test not before. Do not take Biotin 1 week before blood test.      This is  very helpful website   [email or web address]  [email or web address]    Don’t take your thyroid hormone at the same time as:  Walnuts.  Soybean flour.  Cottonseed meal.  Iron supplements or multivitamins containing iron.  Calcium supplements.  Antacids that contain aluminum, magnesium or calcium.  Some ulcer medicines, such as sucralfate (Carafate).  Some cholesterol-lowering drugs, such as those containing cholestyramine (Prevalite, Locholest) and colestipol (Colestid).  To avoid possible problems, eat these foods or use these products several hours before or after you take your thyroid medicine.    Supplements containing biotin, common in hair and nail products, can make it hard to measure how much thyroid hormone is in the body. Biotin does not affect thyroid hormone levels. But supplements that have biotin should be stopped for at least a week before measuring thyroid function so that the measurement is correct.

## 2024-06-28 ENCOUNTER — LAB ENCOUNTER (OUTPATIENT)
Dept: LAB | Age: 49
End: 2024-06-28
Attending: RADIOLOGY
Payer: COMMERCIAL

## 2024-06-28 DIAGNOSIS — C73 THYROID CA (HCC): ICD-10-CM

## 2024-06-28 PROCEDURE — 86800 THYROGLOBULIN ANTIBODY: CPT

## 2024-06-28 PROCEDURE — 36415 COLL VENOUS BLD VENIPUNCTURE: CPT

## 2024-06-30 LAB
LC THYROGLOBIN LCMS: 1.8 NG/ML
THYROGLOB AB: 114 IU/ML

## 2024-07-08 ENCOUNTER — HOSPITAL ENCOUNTER (OUTPATIENT)
Dept: NUCLEAR MEDICINE | Facility: HOSPITAL | Age: 49
Discharge: HOME | End: 2024-07-08
Attending: OTOLARYNGOLOGY
Payer: COMMERCIAL

## 2024-07-08 DIAGNOSIS — C73 THYROID CANCER (HCC): ICD-10-CM

## 2024-07-08 PROCEDURE — 78018 THYROID MET IMAGING BODY: CPT | Performed by: OTOLARYNGOLOGY

## 2024-07-08 PROCEDURE — 78020 THYROID MET UPTAKE: CPT | Performed by: OTOLARYNGOLOGY

## 2024-07-10 ENCOUNTER — HOSPITAL ENCOUNTER (OUTPATIENT)
Dept: NUCLEAR MEDICINE | Facility: HOSPITAL | Age: 49
Discharge: HOME | End: 2024-07-10
Attending: OTOLARYNGOLOGY
Payer: COMMERCIAL

## 2024-07-10 RX ORDER — ONDANSETRON HYDROCHLORIDE 8 MG/1
8 TABLET, FILM COATED ORAL ONCE
Qty: 1 TABLET | Refills: 0 | Status: SHIPPED
Start: 2024-07-10 | End: 2024-07-10

## 2024-07-12 ENCOUNTER — HOSPITAL ENCOUNTER (OUTPATIENT)
Dept: NUCLEAR MEDICINE | Facility: HOSPITAL | Age: 49
Discharge: HOME | End: 2024-07-12
Attending: OTOLARYNGOLOGY
Payer: COMMERCIAL

## 2024-07-12 DIAGNOSIS — C73 THYROID CANCER (HCC): ICD-10-CM

## 2024-07-12 PROCEDURE — 79005 NUCLEAR RX ORAL ADMIN: CPT | Performed by: OTOLARYNGOLOGY

## 2024-07-15 ENCOUNTER — TELEPHONE (OUTPATIENT)
Dept: OTOLARYNGOLOGY | Facility: CLINIC | Age: 49
End: 2024-07-15

## 2024-07-19 ENCOUNTER — HOSPITAL ENCOUNTER (OUTPATIENT)
Dept: NUCLEAR MEDICINE | Facility: HOSPITAL | Age: 49
Discharge: HOME | End: 2024-07-19
Attending: OTOLARYNGOLOGY
Payer: COMMERCIAL

## 2024-07-19 DIAGNOSIS — C73 THYROID CANCER (HCC): ICD-10-CM

## 2024-07-19 PROCEDURE — 78018 THYROID MET IMAGING BODY: CPT | Performed by: OTOLARYNGOLOGY

## 2024-07-21 LAB
LC THYROGLOBIN LCMS: 1.1 NG/ML
THYROGLOB AB: 127.2 IU/ML

## 2024-07-22 ENCOUNTER — TELEPHONE (OUTPATIENT)
Dept: OTOLARYNGOLOGY | Facility: CLINIC | Age: 49
End: 2024-07-22

## 2024-08-29 ENCOUNTER — OFFICE VISIT (OUTPATIENT)
Facility: LOCATION | Age: 49
End: 2024-08-29

## 2024-08-29 ENCOUNTER — LAB ENCOUNTER (OUTPATIENT)
Dept: LAB | Age: 49
End: 2024-08-29
Attending: INTERNAL MEDICINE
Payer: COMMERCIAL

## 2024-08-29 VITALS
WEIGHT: 185.63 LBS | BODY MASS INDEX: 25.99 KG/M2 | DIASTOLIC BLOOD PRESSURE: 60 MMHG | HEIGHT: 71 IN | OXYGEN SATURATION: 99 % | SYSTOLIC BLOOD PRESSURE: 110 MMHG | HEART RATE: 78 BPM

## 2024-08-29 DIAGNOSIS — E89.0 HYPOTHYROIDISM, POSTSURGICAL: ICD-10-CM

## 2024-08-29 DIAGNOSIS — E55.9 VITAMIN D DEFICIENCY: ICD-10-CM

## 2024-08-29 DIAGNOSIS — C73 THYROID CANCER (HCC): Primary | ICD-10-CM

## 2024-08-29 DIAGNOSIS — C73 THYROID CANCER (HCC): ICD-10-CM

## 2024-08-29 LAB — TSI SER-ACNC: 0.89 MIU/ML (ref 0.55–4.78)

## 2024-08-29 PROCEDURE — 3074F SYST BP LT 130 MM HG: CPT | Performed by: INTERNAL MEDICINE

## 2024-08-29 PROCEDURE — 3008F BODY MASS INDEX DOCD: CPT | Performed by: INTERNAL MEDICINE

## 2024-08-29 PROCEDURE — 84443 ASSAY THYROID STIM HORMONE: CPT

## 2024-08-29 PROCEDURE — 86800 THYROGLOBULIN ANTIBODY: CPT

## 2024-08-29 PROCEDURE — 3078F DIAST BP <80 MM HG: CPT | Performed by: INTERNAL MEDICINE

## 2024-08-29 PROCEDURE — 99214 OFFICE O/P EST MOD 30 MIN: CPT | Performed by: INTERNAL MEDICINE

## 2024-08-29 PROCEDURE — 36415 COLL VENOUS BLD VENIPUNCTURE: CPT

## 2024-08-29 RX ORDER — LEVOTHYROXINE SODIUM 150 UG/1
150 TABLET ORAL
Qty: 90 TABLET | Refills: 0 | Status: SHIPPED | OUTPATIENT
Start: 2024-08-29

## 2024-08-29 NOTE — PROGRESS NOTES
Reason for Visit:    thyroid cancer s/p surgery and JOSE   Requesting Physician:   Mariam Lopez DO    CHIEF COMPLAINT:    Chief Complaint   Patient presents with    Hypothyroidism     Follow-up thyroid labs done 6/24        HISTORY OF PRESENT ILLNESS:   Jaquelin Hernandez is a 49 year old female who presents with  PTC s/p surgery on 6/5/2024 s/p RRAI  106 mci after withdrawal.  Stage I, pT1b pN0.  Multifocal classic PTC, BL, largest 1.1 CM with microscopic ETE    JOSE I-131 on 7/12/24 7/19/2024 Post Tx  WBS uptake in the thyroid bed. No abnormal activity is seen outside of the neck     6/25/2024 TSH 83 Tg     6/2/2024 Tg 1.1 with high TgAb 127   Started levothyroxine  150 mcg/day 1 week post  JOSE treatment  Has hot flashes and bruising easily   Has insomnia  which got worse after levothyroxine   No tremors   No palpitations     She had ASD repair in the past   PCP did pre op w/u for knee surgery and found to have thyroid nodule         Patho showed    FNA 5/10/2024  Right inferior/medial thyroid nodule-Cytologic features consistent with papillary thyroid carcinoma ([facility] 6    6/5/2024 pathology   Thyroid; total thyroidectomy:  Multifocal papillary thyroid carcinoma (1.1 cm in greatest dimension), arising in a background of benign follicular nodule, follicular hyperplasia and lymphocytic thyroiditis.  One parathyroid gland involved by the papillary thyroid carcinoma.  Negative inked surgical resection margins.  Prior procedure site changes are identified.  One lymph node, negative for metastatic carcinoma (0/1).  Pathological stage classification (pTNM, AJCC 8th edition): pT1b pN0.        Electronically signed by Yvonne Quinones MD on 6/11/2024 at  2:24 PM        Final Diagnosis Comment      For  purposes, this case was reviewed by a second pathologist who concurred with the diagnosis.        Synoptic Report     THYROID GLAND   8th Edition - Protocol posted: 3/22/2023THYROID GLAND:  RESECTION - All Specimens  SPECIMEN   Procedure  Total thyroidectomy   TUMOR   Tumor Focality  Multifocal   Tumor Characteristics     Tumor Site  Right lobe     Left lobe   Tumor Size  Greatest Dimension (Centimeters): 1.1 cm   Histologic Tumor Types and Subtypes  Papillary carcinoma, classic subtype   Tumor Proliferative Activity     Mitotic Rate  Less than 3 mitoses per 2mm2   Tumor Necrosis  Not identified   Angioinvasion (vascular invasion)  Not identified   Lymphatic Invasion  Not identified   Extrathyroidal Extension  Present, microscopic strap muscle invasion only, with no clinical / macroscopic evidence of invasion   Margin Status  All margins negative for carcinoma   Distance from Invasive Carcinoma to Closest Margin  At least: 1  mm   REGIONAL LYMPH NODES   Regional Lymph Node Status  All regional lymph nodes negative for tumor   Number of Lymph Nodes Examined  2   Nata Level(s) Examined  Level VI   pTNM CLASSIFICATION (AJCC 8th Edition)   Reporting of pT, pN, and (when applicable) pM categories is based on information available to the pathologist at the time the report is issued. As per the AJCC (Chapter 1, 8th Ed.) it is the managing physician’s responsibility to establish the final pathologic stage based upon all pertinent information, including but potentially not limited to this pathology report.        pT Category  pT1b   pN Category  pN0   .            ASSESSMENT AND PLAN:  49 year old female who presents with  PTC s/p surgery on 6/5/2024  PTC s/p surgery on 6/5/2024 s/p RRAI  106 mci after withdrawal.  Stage I, pT1b pN0.  Multifocal classic PTC, BL, largest 1.1 CM with microscopic ETE    JOSE I-131 on 7/12/24 7/19/2024 Post Tx  WBS uptake in the thyroid bed. No abnormal activity is seen outside of the neck     No compressive sx   No hyperthyroid sx     Plan   Labs today   Labs and follow up in 3 mo   D3 2000 unit/day  Thyroid medication dose: Levothyroxine 150 mcg /day   Take you medication on  empty stomach, not with any other medication or food. Wait 60 minutes before eating. Wait 4 hours before taking Vitamins, Calcium or iron. On the morning of the lab test, please take the medication after the blood test not before. Do not take Biotin 1 week before blood test.      This is  very helpful website   [email or web address]  [email or web address]    Don’t take your thyroid hormone at the same time as:  Walnuts.  Soybean flour.  Cottonseed meal.  Iron supplements or multivitamins containing iron.  Calcium supplements.  Antacids that contain aluminum, magnesium or calcium.  Some ulcer medicines, such as sucralfate (Carafate).  Some cholesterol-lowering drugs, such as those containing cholestyramine (Prevalite, Locholest) and colestipol (Colestid).  To avoid possible problems, eat these foods or use these products several hours before or after you take your thyroid medicine.    Supplements containing biotin, common in hair and nail products, can make it hard to measure how much thyroid hormone is in the body. Biotin does not affect thyroid hormone levels. But supplements that have biotin should be stopped for at least a week before measuring thyroid function so that the measurement is correct.        PAST MEDICAL HISTORY:   Past Medical History:    Arthritis    Atrial septal defect (HCC)    corrected via surgery as a child at age 10.     Congenital anomaly of heart (HCC)   PTC s/p surgery in 2024      PAST SURGICAL HISTORY:   Past Surgical History:   Procedure Laterality Date          Hysterectomy  2017    irregular and heavy cycles.     Needle biopsy left      benign    Splint, hammer toe        CURRENT MEDICATIONS:     levothyroxine 150 MCG Oral Tab Take 1 tablet (150 mcg total) by mouth before breakfast. 90 tablet 0    Loratadine-Pseudoephedrine (CLARITIN-D 12 HOUR OR) Take by mouth.        ALLERGIES:  Allergies   Allergen Reactions    Erythromycin NAUSEA AND VOMITING and  OTHER (SEE COMMENTS)     Reactions: migraines, vomiting.   migraines      Penicillins ANAPHYLAXIS and UNKNOWN     NO SKIN BLISTERING  States her mother told her she almost  as an infant when she received pcn      SOCIAL HISTORY:    Social History     Socioeconomic History    Marital status: Single   Tobacco Use    Smoking status: Never    Smokeless tobacco: Never   Vaping Use    Vaping status: Never Used   Substance and Sexual Activity    Alcohol use: Never    Drug use: Never    Sexual activity: Yes     Partners: Female   Used to be    Smoking: no  Marijuana:  no  Etoh: no  Drugs:   no  FAMILY HISTORY:   Family History   Problem Relation Age of Onset    Uterine Cancer Mother 58    Breast Cancer Mother 55    Diabetes Father     Heart Disorder Maternal Grandmother         had and ASD    Prostate Cancer Maternal Grandfather     Diabetes Paternal Grandmother     Diabetes Paternal Uncle       REVIEW OF SYSTEMS:  All negative other than HPI    PHYSICAL EXAM:   Height: 5' 11\" (180.3 cm) (1319)  Weight: 185 lb 9.6 oz (84.2 kg) (1319)  BSA (Calculated - sq m): 2.04 sq meters (1319)  Pulse: 78 (1319)  BP: 110/60 (1319)  Temp: --  Do Not Use - Resp Rate: --  SpO2: 99 % (1319)    Body mass index is 25.89 kg/m².  Scar at the base of hte neck     No tremors   No LAP     DATA:     Pertinent data reviewed      Latest Reference Range & Units 24 05:25   CALCIUM 8.7 - 10.4 mg/dL 9.6      Latest Reference Range & Units 24 16:20   T4,Free (Direct) 0.8 - 1.7 ng/dL 1.3   TSH 0.550 - 4.780 mIU/mL 2.844   T3 FREE 2.40 - 4.20 pg/mL 2.84      24 08:13   NM I-131 POST ABLATION WB STUDY (CPT=78018) Rpt   Date of I-131 therapy:   24   Pathology: Multifocal papillary cell carcinoma, stage pT1bN0   Total body iodine scan showed:   Prominent remnant and/or lymphadenopathy in the thyroid bed.   Dose of Erika-131 therapy capsule:   106.3-mCi   IMPRESSION:   Post therapy total  body iodine scan confirms the uptake in the thyroid bed.  No abnormal activity is seen outside of the neck        Latest Reference Range & Units 06/25/24 14:05   T4,Free (Direct) 0.8 - 1.7 ng/dL 0.4 (L)   TSH 0.550 - 4.780 mIU/mL 83.318 (H)      Latest Reference Range & Units 06/25/24 14:05 06/28/24 10:17   Thyroglob Ab 0.0 - 0.9 IU/mL 114.0 (H) 127.2 (H)   Thyroglobulin by LCMS 1.5 - 38.5 ng/mL 1.8 1.1 (L)      Latest Reference Range & Units 06/25/24 14:05 06/28/24 10:17   Thyroglob Ab 0.0 - 0.9 IU/mL 114.0 (H) 127.2 (H)   Thyroglobulin by LCMS 1.5 - 38.5 ng/mL 1.8 1.1 (L)   (H): Data is abnormally high  (L): Data is abnormally low           No results for input(s): \"TSH\", \"T4F\", \"T3F\", \"THYP\" in the last 72 hours.  No results found.    Orders Placed This Encounter   Procedures    TSH W Reflex To Free T4    Thyroglobulin Antibody and Tumor Marker    TSH W Reflex To Free T4    Thyroglobulin Antibody and Tumor Marker     Orders Placed This Encounter    TSH W Reflex To Free T4     Standing Status:   Future     Standing Expiration Date:   8/29/2025     Order Specific Question:   Release to patient     Answer:   Immediate    Thyroglobulin Antibody and Tumor Marker     Standing Status:   Future     Standing Expiration Date:   8/29/2025     Order Specific Question:   Release to patient     Answer:   Immediate    TSH W Reflex To Free T4     Standing Status:   Future     Number of Occurrences:   1     Standing Expiration Date:   8/29/2025    Thyroglobulin Antibody and Tumor Marker     Standing Status:   Future     Standing Expiration Date:   8/29/2025     Order Specific Question:   Release to patient     Answer:   Immediate    levothyroxine 150 MCG Oral Tab     Sig: Take 1 tablet (150 mcg total) by mouth before breakfast.     Dispense:  90 tablet     Refill:  0          This is a specialized patient consultation in endocrinology and required comprehensive review of prior records, as well as current evaluation, with time  required for consideration of complex endocrine issues and consultation. For this visit, I personally interviewed the patient, and family member if accompanied, performed the pertinent parts of the history and physical examination. ROS included screening for appropriate endocrine conditions.   Today's diagnosis and plan were reviewed in detail with the patient who states understanding and agrees with plan. I discussed with the patient possible diagnosis, differential diagnosis, need for work up, treatment options, alternatives and side effects.     Please see note for details about time spent which includes:   · pre-visit preparation  · reviewing records  · face to face time with the patient   · timely documentation of the encounter  · ordering medications/tests  · communication with care team  · care coordination    I appreciate the opportunity to be part of your patient's medical care and will keep you, as the referring and primary physicians, informed about the care of your patient. Please feel free to contact me should you have any questions.    The 21st Century Cures Act makes medical notes like these available to patients in the interest of transparency. Please be advised this is a medical document. Medical documents are intended to carry relevant information, facts as evident, and the clinical opinion of the practitioner. The medical note is intended as peer to peer communication and may appear blunt or direct. It is written in medical language and may contain abbreviations or verbiage that are unfamiliar.   Jl Obrien MD

## 2024-08-29 NOTE — PATIENT INSTRUCTIONS
Labs today   Labs and follow up in 3 mo   D3 2000 unit/day  Thyroid medication dose: Levothyroxine 150 mcg /day   Take you medication on empty stomach, not with any other medication or food. Wait 60 minutes before eating. Wait 4 hours before taking Vitamins, Calcium or iron. On the morning of the lab test, please take the medication after the blood test not before. Do not take Biotin 1 week before blood test.      This is  very helpful website   [email or web address]  [email or web address]    Don’t take your thyroid hormone at the same time as:  Walnuts.  Soybean flour.  Cottonseed meal.  Iron supplements or multivitamins containing iron.  Calcium supplements.  Antacids that contain aluminum, magnesium or calcium.  Some ulcer medicines, such as sucralfate (Carafate).  Some cholesterol-lowering drugs, such as those containing cholestyramine (Prevalite, Locholest) and colestipol (Colestid).  To avoid possible problems, eat these foods or use these products several hours before or after you take your thyroid medicine.    Supplements containing biotin, common in hair and nail products, can make it hard to measure how much thyroid hormone is in the body. Biotin does not affect thyroid hormone levels. But supplements that have biotin should be stopped for at least a week before measuring thyroid function so that the measurement is correct.

## 2024-09-03 ENCOUNTER — PATIENT MESSAGE (OUTPATIENT)
Facility: LOCATION | Age: 49
End: 2024-09-03

## 2024-09-11 LAB
LC THYROGLOBIN LCMS: <0.2 NG/ML
THYROGLOB AB: 55.2 IU/ML

## 2024-09-13 NOTE — TELEPHONE ENCOUNTER
Dr. Obrien,  Per LOV dtdonte 8/29/24:  Labs today   Labs and follow up in 3 mo     Thyroid medication dose: Levothyroxine 150 mcg /day           Component      Latest Ref Rng 8/29/2024   TSH      0.550 - 4.780 mIU/mL 0.890    Thyroglob Ab      0.0 - 0.9 IU/mL 55.2 (H)    Thyroglobulin by LCMS      1.5 - 38.5 ng/mL <0.2 (L)       Please advise -thanks

## 2024-10-10 ENCOUNTER — OFFICE VISIT (OUTPATIENT)
Dept: OTOLARYNGOLOGY | Facility: CLINIC | Age: 49
End: 2024-10-10
Payer: COMMERCIAL

## 2024-10-10 DIAGNOSIS — C73 THYROID CANCER (HCC): Primary | ICD-10-CM

## 2024-10-10 DIAGNOSIS — R49.0 DYSPHONIA: ICD-10-CM

## 2024-10-10 PROCEDURE — 99213 OFFICE O/P EST LOW 20 MIN: CPT | Performed by: OTOLARYNGOLOGY

## 2024-10-10 PROCEDURE — 31575 DIAGNOSTIC LARYNGOSCOPY: CPT | Performed by: OTOLARYNGOLOGY

## 2024-10-10 RX ORDER — GLYCOPYRROLATE 1 MG/1
1 TABLET ORAL 3 TIMES DAILY
Qty: 90 TABLET | Refills: 1 | Status: SHIPPED | OUTPATIENT
Start: 2024-10-10

## 2024-10-10 NOTE — PROGRESS NOTES
Jaquelin Hernandez is a 49 year old female.    Chief Complaint   Patient presents with    Follow - Up     Patient is here to follow up after thyroidectomy, reports some issues with voice          HISTORY OF PRESENT ILLNESS  She presents with a history of decreased hearing.  She does have a family history of hearing loss but denies any other significant otologic signs or symptoms.  No tinnitus no dizziness no history of otosclerosis in the family.  Audiogram was performed today demonstrating a conductive hearing loss on the right which is mild in nature with normal speech discrimination scores and tympanograms.  Normal hearing on the left.  Otosclerosis?      5/3/24 she presents with recent ultrasound demonstrating enlarged thyroid nodules on the right.  TR 3's and TR 5 nodule here for further evaluation and management.  Complains of throat discomfort and has recently been looked at for allergies.  Currently on no significant medications.     5/16/24 she presents today to go over the results of her ultrasound-guided biopsies.  The TR 5 nodule demonstrated papillary carcinoma.  Here to discuss further management.  Has many questions regarding surgical management and postsurgical treatment.     6/5/24 thyroidectomy and pretracheal lymphadenectomy     6/6/24 postop day #1 status post total thyroidectomy and pretracheal lymphadenectomy.  Path currently unavailable.  Calcium 9.6 this morning.  No hyper or hypocalcemic signs or symptoms.  Voice normal voiding well ambulating without difficulty.  Tolerating a diet with slight discomfort as expected.  Typical postsurgical neck discomfort.  Drain with serosanguineous drainage.  Surgery discussed in detail with patient at the bedside today.     6/8/24 doing very well he is postop day #3 status post total thyroidectomy and pretracheal lymphadenectomy for removal of her drain voice normal some neck discomfort otherwise doing well.     6/13/24   Final Diagnosis:      A.  Pretracheal fat; excision:   Fatty tissue with one lymph node, negative for metastatic carcinoma (0/1).     B.  Thyroid; total thyroidectomy:  Multifocal papillary thyroid carcinoma (1.1 cm in greatest dimension), arising in a background of benign follicular nodule, follicular hyperplasia and lymphocytic thyroiditis.  One parathyroid gland involved by the papillary thyroid carcinoma.  Negative inked surgical resection margins.  Prior procedure site changes are identified.  One lymph node, negative for metastatic carcinoma (0/1).  Pathological stage classification (pTNM, AJCC 8th edition): pT1b pN0.         8 days out from total thyroidectomy.  Doing very well no complaints or concerns.  Here to discuss further management.  Only concern is perhaps some fullness when swallowing which is worsened after removal of her drain.  Otherwise reviewed with patient in the office today.    10/10/24 she presents today 4 months out from total thyroidectomy for thyroid cancer.  Papillary T1 b N0 with 1 lymph node negative for carcinoma.  She had postsurgical reactive iodine treatment.  Does complain of postnasal discharge as well as voice changes.  Having a hard time singing voice more strained throughout the day.  Also complains of headaches facial pressure as well as sensation of just being lightheaded.  Denies grinding clenching behavior.  Does have neck pain issues.      Social History     Socioeconomic History    Marital status: Single   Tobacco Use    Smoking status: Never    Smokeless tobacco: Never   Vaping Use    Vaping status: Never Used   Substance and Sexual Activity    Alcohol use: Never    Drug use: Never    Sexual activity: Yes     Partners: Female       Family History   Problem Relation Age of Onset    Uterine Cancer Mother 58    Breast Cancer Mother 55    Diabetes Father     Heart Disorder Maternal Grandmother         had and ASD    Prostate Cancer Maternal Grandfather     Diabetes Paternal Grandmother     Diabetes  Paternal Uncle        Past Medical History:    Arthritis    Atrial septal defect (HCC)    corrected via surgery as a child at age 10.     Congenital anomaly of heart (HCC)       Past Surgical History:   Procedure Laterality Date          Hysterectomy  2017    irregular and heavy cycles.     Needle biopsy left      benign    Splint, hammer toe           REVIEW OF SYSTEMS    System Neg/Pos Details   Constitutional Negative Fatigue, fever and weight loss.   ENMT Negative Drooling.   Eyes Negative Blurred vision and vision changes.   Respiratory Negative Dyspnea and wheezing.   Cardio Negative Chest pain, irregular heartbeat/palpitations and syncope.   GI Negative Abdominal pain and diarrhea.   Endocrine Negative Cold intolerance and heat intolerance.   Neuro Negative Tremors.   Psych Negative Anxiety and depression.   Integumentary Negative Frequent skin infections, pigment change and rash.   Hema/Lymph Negative Easy bleeding and easy bruising.           PHYSICAL EXAM    LMP 10/20/2014        Constitutional Normal Overall appearance - Normal.   Psychiatric Normal Orientation - Oriented to time, place, person & situation. Appropriate mood and affect.   Neck Exam Normal Inspection - Normal. Palpation - Normal. Parotid gland - Normal. Thyroid gland - Normal.   Eyes Normal Conjunctiva - Right: Normal, Left: Normal. Pupil - Right: Normal, Left: Normal. Fundus - Right: Normal, Left: Normal.   Neurological Normal Memory - Normal. Cranial nerves - Cranial nerves II through XII grossly intact.   Head/Face Normal Facial features - Normal. Eyebrows - Normal. Skull - Normal.        Nasopharynx Normal External nose - Normal. Lips/teeth/gums - Normal. Tonsils - Normal. Oropharynx - Normal.   Ears Normal Inspection - Right: Normal, Left: Normal. Canal - Right: Normal, Left: Normal. TM - Right: Normal, Left: Normal.   Skin Normal Inspection - Normal.        Lymph Detail Normal Submental. Submandibular. Anterior cervical.  Posterior cervical. Supraclavicular.        Nose/Mouth/Throat Normal External nose - Normal. Lips/teeth-she appears to have wearing of the teeth/gums - Normal. Tonsils - Normal. Oropharynx - Normal.   Nose/Mouth/Throat Normal Nares - Right: Normal Left: Normal. Septum -Normal  Turbinates - Right: Normal, Left: Normal.   Procedures:  Endoscopy/Laryngoscopy  Pre-Procedure Care: Verbal consent was obtained. Procedure/risks were explained. Questions were answered. Correct patient identified. Correct side and site confirmed.      A topical spray of ).25% Neosynephrine was sprayed into the nose.    Laryngoscopy:  Flexible Fiberoptic Laryngoscopy: A diagnostic flexible fiberoptic laryngoscopy was performed. The flexible fiberoptic laryngoscope was placed into the nose or mouthand advanced  into the interior of the larynx. A thorough examination of the interior of the larynx was performed.   Findings were as follows.       Hypopharynx/Larynx:  Epiglottis is normal.  Arytenoids:  Bilateral: Arytenoids are normal.  Vocal folds-false  Bilateral: Vocal folds (false) are normal.  Vocal folds-true  Bilateral: Vocal folds (true) are normal.  Pyriform sinus:  Bilateral: Pyriform sinuses are normal.  Base of tongue is normal in appearance.  There is no airway obstruction.   General comments: Laryngeal structures significant postnasal discharge.  Completely normal movements with normal abduction and adduction of the vocal cords on exam.  Normal voice.              Current Outpatient Medications:     glycopyrrolate 1 MG Oral Tab, Take 1 tablet (1 mg total) by mouth 3 (three) times daily., Disp: 90 tablet, Rfl: 1    levothyroxine 150 MCG Oral Tab, Take 1 tablet (150 mcg total) by mouth before breakfast., Disp: 90 tablet, Rfl: 0    Loratadine-Pseudoephedrine (CLARITIN-D 12 HOUR OR), Take by mouth., Disp: , Rfl:     HYDROcodone-acetaminophen (NORCO) 7.5-325 MG Oral Tab, Take 1 tablet by mouth every 6 (six) hours as needed., Disp: 30  tablet, Rfl: 0  ASSESSMENT AND PLAN    1. Thyroid cancer (HCC)    2. Dysphonia  Laryngoscopy reveals completely normal vocal cord motion.  No evidence of weakness or paralysis.  She does have significant postnasal discharge and erythema of the laryngeal structures to suggest possible inflammation from postnasal drip.  Start glycopyrrolate continue fluticasone and Claritin-D.  Hold off on Singulair wanted symptoms.  I do think she has some TMJ issues as well but we will hold off on oral medications for now and have her simply use warm heat soft diet chewing both sides of mouth and see her dentist regarding possible need for a bite guard.  - LARYNGOSCOPY,FLEX FIBER,DIAGNOSTIC        This note was prepared using Dragon [employer] voice recognition dictation software. As a result errors may occur. When identified these errors have been corrected. While every attempt is made to correct errors during dictation discrepancies may still exist    Dick Walters MD    10/10/2024    1:54 PM

## 2024-11-12 ENCOUNTER — LAB ENCOUNTER (OUTPATIENT)
Dept: LAB | Age: 49
End: 2024-11-12
Attending: INTERNAL MEDICINE
Payer: COMMERCIAL

## 2024-11-12 DIAGNOSIS — C73 THYROID CANCER (HCC): ICD-10-CM

## 2024-11-12 DIAGNOSIS — E89.0 HYPOTHYROIDISM, POSTSURGICAL: ICD-10-CM

## 2024-11-12 DIAGNOSIS — E55.9 VITAMIN D DEFICIENCY: ICD-10-CM

## 2024-11-12 LAB — TSI SER-ACNC: 0.25 UIU/ML (ref 0.55–4.78)

## 2024-11-12 PROCEDURE — 36415 COLL VENOUS BLD VENIPUNCTURE: CPT

## 2024-11-12 PROCEDURE — 86800 THYROGLOBULIN ANTIBODY: CPT

## 2024-11-13 LAB — T4 FREE SERPL-MCNC: 2 NG/DL (ref 0.8–1.7)

## 2024-11-22 LAB
LC THYROGLOBIN LCMS: <0.2 NG/ML
THYROGLOB AB: 16.9 IU/ML

## 2024-12-03 ENCOUNTER — OFFICE VISIT (OUTPATIENT)
Facility: LOCATION | Age: 49
End: 2024-12-03
Payer: COMMERCIAL

## 2024-12-03 VITALS
DIASTOLIC BLOOD PRESSURE: 74 MMHG | HEART RATE: 78 BPM | HEIGHT: 71 IN | WEIGHT: 190 LBS | BODY MASS INDEX: 26.6 KG/M2 | SYSTOLIC BLOOD PRESSURE: 118 MMHG

## 2024-12-03 DIAGNOSIS — E89.0 HYPOTHYROIDISM, POSTSURGICAL: ICD-10-CM

## 2024-12-03 DIAGNOSIS — C73 THYROID CANCER (HCC): Primary | ICD-10-CM

## 2024-12-03 DIAGNOSIS — E55.9 VITAMIN D DEFICIENCY: ICD-10-CM

## 2024-12-03 DIAGNOSIS — E07.9 THYROID DISEASE: ICD-10-CM

## 2024-12-03 PROCEDURE — 99214 OFFICE O/P EST MOD 30 MIN: CPT | Performed by: INTERNAL MEDICINE

## 2024-12-03 PROCEDURE — 3074F SYST BP LT 130 MM HG: CPT | Performed by: INTERNAL MEDICINE

## 2024-12-03 PROCEDURE — 3078F DIAST BP <80 MM HG: CPT | Performed by: INTERNAL MEDICINE

## 2024-12-03 PROCEDURE — 3008F BODY MASS INDEX DOCD: CPT | Performed by: INTERNAL MEDICINE

## 2024-12-03 RX ORDER — LEVOTHYROXINE SODIUM 150 UG/1
150 TABLET ORAL
Qty: 90 TABLET | Refills: 0 | Status: SHIPPED | OUTPATIENT
Start: 2024-12-03

## 2024-12-03 NOTE — PROGRESS NOTES
Reason for Visit:  thyroid cancer s/p surgery and JOSE   Requesting Physician:   Mariam Lopez DO    CHIEF COMPLAINT:    Chief Complaint   Patient presents with    Thyroid Problem     F/u        HISTORY OF PRESENT ILLNESS:   Jaquelin Hernandez is a 49 year old female who presents with  PTC s/p surgery on 6/5/2024 s/p RRAI  106 mci after withdrawal.  Stage I, pT1b pN0 M0  Multifocal classic PTC, BL, largest 1.1 CM with microscopic ETE    JOSE I-131 on 7/12/24 7/19/2024 Post Tx  WBS uptake in the thyroid bed. No abnormal activity is seen outside of the neck        She is on levothyroxine  150 mcg/day as rec'   She denied hyperthyroid sx   Denied compressive sx   TSH is 0.2 now on Tg Ab is downtrending from 127.2 -> 55.2 -> 16.9      No tremors   No palpitations     She had ASD repair in the past   PCP did pre op w/u for knee surgery and found to have thyroid nodule    Patho showed    FNA 5/10/2024  Right inferior/medial thyroid nodule-Cytologic features consistent with papillary thyroid carcinoma ([place] 6    6/5/2024 pathology   Thyroid; total thyroidectomy:  Multifocal papillary thyroid carcinoma (1.1 cm in greatest dimension), arising in a background of benign follicular nodule, follicular hyperplasia and lymphocytic thyroiditis.  One parathyroid gland involved by the papillary thyroid carcinoma.  Negative inked surgical resection margins.  Prior procedure site changes are identified.  One lymph node, negative for metastatic carcinoma (0/1).  Pathological stage classification (pTNM, AJCC 8th edition): pT1b pN0.        Electronically signed by Yvonne Quinones MD on 6/11/2024 at  2:24 PM        Final Diagnosis Comment      For  purposes, this case was reviewed by a second pathologist who concurred with the diagnosis.        Synoptic Report     THYROID GLAND   8th Edition - Protocol posted: 3/22/2023THYROID GLAND: RESECTION - All Specimens  SPECIMEN   Procedure  Total thyroidectomy    TUMOR   Tumor Focality  Multifocal   Tumor Characteristics     Tumor Site  Right lobe     Left lobe   Tumor Size  Greatest Dimension (Centimeters): 1.1 cm   Histologic Tumor Types and Subtypes  Papillary carcinoma, classic subtype   Tumor Proliferative Activity     Mitotic Rate  Less than 3 mitoses per 2mm2   Tumor Necrosis  Not identified   Angioinvasion (vascular invasion)  Not identified   Lymphatic Invasion  Not identified   Extrathyroidal Extension  Present, microscopic strap muscle invasion only, with no clinical / macroscopic evidence of invasion   Margin Status  All margins negative for carcinoma   Distance from Invasive Carcinoma to Closest Margin  At least: 1  mm   REGIONAL LYMPH NODES   Regional Lymph Node Status  All regional lymph nodes negative for tumor   Number of Lymph Nodes Examined  2   [place] Level(s) Examined  Level VI   pTNM CLASSIFICATION (AJCC 8th Edition)   Reporting of pT, pN, and (when applicable) pM categories is based on information available to the pathologist at the time the report is issued. As per the AJCC (Chapter 1, 8th Ed.) it is the managing physician’s responsibility to establish the final pathologic stage based upon all pertinent information, including but potentially not limited to this pathology report.        pT Category  pT1b   pN Category  pN0   .          6/2024 TSH 83, TgAb < 127 , Tg  1.1. LT4 w/d  8/29/2024 TSH 0.8 , TgAb < 55, Tg 0.2  11/12/2024 TSH 0.2, FT4 2, TgAb < 16.9, Tg  <0.2      ASSESSMENT AND PLAN:  49 year old female who presents with  PTC s/p surgery on 6/5/2024  PTC s/p surgery on 6/5/2024 s/p JOSE  106 mci after withdrawal.  Stage I, pT1b pN0M0.  Multifocal classic PTC, BL, largest 1.1 CM with microscopic ETE    JOSE I-131 on 7/12/24 7/19/2024 Post Tx  WBS uptake in the thyroid bed. No abnormal activity is seen outside of the neck     No compressive sx   No hyperthyroid sx   Labs showed TSH is low , TgAb downtrending now.   Plan   US with lymph node  mapping now and in 2025  Labs and follow up in 3 mo   D3 2000 unit/day  Thyroid medication dose: Levothyroxine 150 mcg /day   Take you medication on empty stomach, not with any other medication or food. Wait 60 minutes before eating. Wait 4 hours before taking Vitamins, Calcium or iron. On the morning of the lab test, please take the medication after the blood test not before. Do not take Biotin 1 week before blood test.      This is  very helpful website   [email or web address]  [email or web address]    Don’t take your thyroid hormone at the same time as:  Walnuts.  Soybean flour.  Cottonseed meal.  Iron supplements or multivitamins containing iron.  Calcium supplements.  Antacids that contain aluminum, magnesium or calcium.  Some ulcer medicines, such as sucralfate (Carafate).  Some cholesterol-lowering drugs, such as those containing cholestyramine (Prevalite, Locholest) and colestipol (Colestid).  To avoid possible problems, eat these foods or use these products several hours before or after you take your thyroid medicine.    Supplements containing biotin, common in hair and nail products, can make it hard to measure how much thyroid hormone is in the body. Biotin does not affect thyroid hormone levels. But supplements that have biotin should be stopped for at least a week before measuring thyroid function so that the measurement is correct.        PAST MEDICAL HISTORY:   Past Medical History:    Arthritis    Atrial septal defect (HCC)    corrected via surgery as a child at age 10.     Congenital anomaly of heart (HCC)   PTC s/p surgery in 2024      PAST SURGICAL HISTORY:   Past Surgical History:   Procedure Laterality Date          Hysterectomy  2017    irregular and heavy cycles.     Needle biopsy left      benign    Splint, hammer toe        CURRENT MEDICATIONS:     levothyroxine 150 MCG Oral Tab Take 1 tablet (150 mcg total) by mouth before breakfast. 90 tablet 0       ALLERGIES:  Allergies   Allergen Reactions    Erythromycin NAUSEA AND VOMITING and OTHER (SEE COMMENTS)     Reactions: migraines, vomiting.   migraines      Penicillins ANAPHYLAXIS and UNKNOWN     NO SKIN BLISTERING  States her mother told her she almost  as an infant when she received pcn      SOCIAL HISTORY:    Social History     Socioeconomic History    Marital status: Single   Tobacco Use    Smoking status: Never    Smokeless tobacco: Never   Vaping Use    Vaping status: Never Used   Substance and Sexual Activity    Alcohol use: Never    Drug use: Never    Sexual activity: Yes     Partners: Female   Used to be    Smoking: no  Marijuana:  no  Etoh: no  Drugs:   no  FAMILY HISTORY:   Family History   Problem Relation Age of Onset    Uterine Cancer Mother 58    Breast Cancer Mother 55    Diabetes Father     Heart Disorder Maternal Grandmother         had and ASD    Prostate Cancer Maternal Grandfather     Diabetes Paternal Grandmother     Diabetes Paternal Uncle          PHYSICAL EXAM:   Height: 5' 11\" (180.3 cm) ( 1248)  Weight: 190 lb (86.2 kg) (8)  BSA (Calculated - sq m): 2.06 sq meters ( 1248)  Pulse: 78 ( 1248)  BP: 118/74 (8)  Temp: --  Do Not Use - Resp Rate: --  SpO2: --    Body mass index is 26.5 kg/m².  Scar at the base of hte neck     No tremors   No LAP     DATA:     Pertinent data reviewed       Latest Reference Range & Units 24 13:41 24 15:47   T4,Free (Direct) 0.8 - 1.7 ng/dL  2.0 (H)   TSH 0.550 - 4.780 uIU/mL 0.890 0.252 (L)      Latest Reference Range & Units 24 13:41 24 15:47   Thyroglob Ab 0.0 - 0.9 IU/mL 55.2 (H) 16.9 (H)   Thyroglobulin by LCMS 1.5 - 38.5 ng/mL <0.2 (L) <0.2 (L)   (H): Data is abnormally high  (L): Data is abnormally low   24 08:13   NM I-131 POST ABLATION WB STUDY (CPT=78018) Rpt   Date of I-131 therapy:   24   Pathology: Multifocal papillary cell carcinoma, stage pT1bN0   Total body  iodine scan showed:   Prominent remnant and/or lymphadenopathy in the thyroid bed.   Dose of Erika-131 therapy capsule:   106.3-mCi   IMPRESSION:   Post therapy total body iodine scan confirms the uptake in the thyroid bed.  No abnormal activity is seen outside of the neck        Latest Reference Range & Units 06/25/24 14:05   T4,Free (Direct) 0.8 - 1.7 ng/dL 0.4 (L)   TSH 0.550 - 4.780 mIU/mL 83.318 (H)      Latest Reference Range & Units 06/25/24 14:05 06/28/24 10:17   Thyroglob Ab 0.0 - 0.9 IU/mL 114.0 (H) 127.2 (H)   Thyroglobulin by LCMS 1.5 - 38.5 ng/mL 1.8 1.1 (L)      Latest Reference Range & Units 06/25/24 14:05 06/28/24 10:17   Thyroglob Ab 0.0 - 0.9 IU/mL 114.0 (H) 127.2 (H)   Thyroglobulin by LCMS 1.5 - 38.5 ng/mL 1.8 1.1 (L)   (H): Data is abnormally high  (L): Data is abnormally low           No results for input(s): \"TSH\", \"T4F\", \"T3F\", \"THYP\" in the last 72 hours.  No results found.    Orders Placed This Encounter   Procedures    Thyroglobulin Antibody and Tumor Marker    TSH W Reflex To Free T4     Orders Placed This Encounter    Thyroglobulin Antibody and Tumor Marker     Standing Status:   Future     Standing Expiration Date:   12/3/2025     Order Specific Question:   Release to patient     Answer:   Immediate    TSH W Reflex To Free T4     Standing Status:   Future     Standing Expiration Date:   12/3/2025     Order Specific Question:   Release to patient     Answer:   Immediate    levothyroxine 150 MCG Oral Tab     Sig: Take 1 tablet (150 mcg total) by mouth before breakfast.     Dispense:  90 tablet     Refill:  0          This is a specialized patient consultation in endocrinology and required comprehensive review of prior records, as well as current evaluation, with time required for consideration of complex endocrine issues and consultation. For this visit, I personally interviewed the patient, and family member if accompanied, performed the pertinent parts of the history and physical  examination. ROS included screening for appropriate endocrine conditions.   Today's diagnosis and plan were reviewed in detail with the patient who states understanding and agrees with plan. I discussed with the patient possible diagnosis, differential diagnosis, need for work up, treatment options, alternatives and side effects.     Please see note for details about time spent which includes:   · pre-visit preparation  · reviewing records  · face to face time with the patient   · timely documentation of the encounter  · ordering medications/tests  · communication with care team  · care coordination    I appreciate the opportunity to be part of your patient's medical care and will keep you, as the referring and primary physicians, informed about the care of your patient. Please feel free to contact me should you have any questions.    The 21st Century Cures Act makes medical notes like these available to patients in the interest of transparency. Please be advised this is a medical document. Medical documents are intended to carry relevant information, facts as evident, and the clinical opinion of the practitioner. The medical note is intended as peer to peer communication and may appear blunt or direct. It is written in medical language and may contain abbreviations or verbiage that are unfamiliar.   Jl Obrien MD

## 2024-12-03 NOTE — PATIENT INSTRUCTIONS
US with lymph node mapping now and in 7/2025  Labs and follow up in 3 mo   D3 2000 unit/day  Thyroid medication dose: Levothyroxine 150 mcg /day   Take you medication on empty stomach, not with any other medication or food. Wait 60 minutes before eating. Wait 4 hours before taking Vitamins, Calcium or iron. On the morning of the lab test, please take the medication after the blood test not before. Do not take Biotin 1 week before blood test.      This is  very helpful website   [email or web address]  [email or web address]    Don’t take your thyroid hormone at the same time as:  Walnuts.  Soybean flour.  Cottonseed meal.  Iron supplements or multivitamins containing iron.  Calcium supplements.  Antacids that contain aluminum, magnesium or calcium.  Some ulcer medicines, such as sucralfate (Carafate).  Some cholesterol-lowering drugs, such as those containing cholestyramine (Prevalite, Locholest) and colestipol (Colestid).  To avoid possible problems, eat these foods or use these products several hours before or after you take your thyroid medicine.    Supplements containing biotin, common in hair and nail products, can make it hard to measure how much thyroid hormone is in the body. Biotin does not affect thyroid hormone levels. But supplements that have biotin should be stopped for at least a week before measuring thyroid function so that the measurement is correct.

## 2025-02-21 ENCOUNTER — LAB ENCOUNTER (OUTPATIENT)
Dept: LAB | Age: 50
End: 2025-02-21
Attending: INTERNAL MEDICINE
Payer: COMMERCIAL

## 2025-02-21 DIAGNOSIS — C73 THYROID CANCER (HCC): ICD-10-CM

## 2025-02-21 DIAGNOSIS — E89.0 HYPOTHYROIDISM, POSTSURGICAL: ICD-10-CM

## 2025-02-21 LAB
T4 FREE SERPL-MCNC: 2.5 NG/DL (ref 0.8–1.7)
TSI SER-ACNC: 0.01 UIU/ML (ref 0.55–4.78)

## 2025-02-21 PROCEDURE — 84443 ASSAY THYROID STIM HORMONE: CPT

## 2025-02-21 PROCEDURE — 86800 THYROGLOBULIN ANTIBODY: CPT

## 2025-02-21 PROCEDURE — 84439 ASSAY OF FREE THYROXINE: CPT

## 2025-02-21 PROCEDURE — 36415 COLL VENOUS BLD VENIPUNCTURE: CPT

## 2025-03-04 ENCOUNTER — OFFICE VISIT (OUTPATIENT)
Facility: LOCATION | Age: 50
End: 2025-03-04
Payer: COMMERCIAL

## 2025-03-04 VITALS
WEIGHT: 194 LBS | DIASTOLIC BLOOD PRESSURE: 74 MMHG | HEART RATE: 65 BPM | SYSTOLIC BLOOD PRESSURE: 108 MMHG | HEIGHT: 71 IN | BODY MASS INDEX: 27.16 KG/M2

## 2025-03-04 DIAGNOSIS — E07.9 THYROID DISEASE: Primary | ICD-10-CM

## 2025-03-04 PROCEDURE — 3008F BODY MASS INDEX DOCD: CPT | Performed by: INTERNAL MEDICINE

## 2025-03-04 PROCEDURE — 99214 OFFICE O/P EST MOD 30 MIN: CPT | Performed by: INTERNAL MEDICINE

## 2025-03-04 PROCEDURE — 3078F DIAST BP <80 MM HG: CPT | Performed by: INTERNAL MEDICINE

## 2025-03-04 PROCEDURE — 3074F SYST BP LT 130 MM HG: CPT | Performed by: INTERNAL MEDICINE

## 2025-03-04 RX ORDER — LEVOTHYROXINE SODIUM 125 UG/1
125 TABLET ORAL
Qty: 90 TABLET | Refills: 1 | Status: SHIPPED | OUTPATIENT
Start: 2025-03-25 | End: 2025-09-21

## 2025-03-04 NOTE — PROGRESS NOTES
Reason for Visit:  thyroid cancer s/p surgery and JOSE   Requesting Physician:   Mariam Lopez DO    CHIEF COMPLAINT:    Chief Complaint   Patient presents with    Thyroid Problem     F/u        HISTORY OF PRESENT ILLNESS:   Jaquelin Hernandez is a 49 year old female who presents with  PTC s/p surgery on 6/5/2024 s/p JOSE  106 mci after withdrawal.  Stage I, pT1b pN0 M0  Multifocal classic PTC, BL, largest 1.1 CM with microscopic ETE  JOSE I-131 on 7/12/24 7/19/2024 Post Tx  WBS uptake in the thyroid bed. No abnormal activity is seen outside of the neck        She is on levothyroxine 150 mcg/day. She started skipping once a week ~ 2 weeks ago after labs. She is taking meds as rec'   She denied hyperthyroid symptoms even before we decreased the dose   Denied compressive symptoms.   She gained wt and noticed increased appetite with higher thyroid doses.    US thyroid  in April   Tg panel still pending     TSH is 0.2 now on Tg Ab is downtrending from 127.2 -> 55.2 -> 16.9      No tremors      She had ASD repair in the past   PCP did pre op w/u for knee surgery and found to have thyroid nodule    Patho showed    FNA 5/10/2024  Right inferior/medial thyroid nodule-Cytologic features consistent with papillary thyroid carcinoma ([place] 6    6/5/2024 pathology   Thyroid; total thyroidectomy:  Multifocal papillary thyroid carcinoma (1.1 cm in greatest dimension), arising in a background of benign follicular nodule, follicular hyperplasia and lymphocytic thyroiditis.  One parathyroid gland involved by the papillary thyroid carcinoma.  Negative inked surgical resection margins.  Prior procedure site changes are identified.  One lymph node, negative for metastatic carcinoma (0/1).  Pathological stage classification (pTNM, AJCC 8th edition): pT1b pN0.        Electronically signed by Yvonne Quinones MD on 6/11/2024 at  2:24 PM        Final Diagnosis Comment      For  purposes, this case was  reviewed by a second pathologist who concurred with the diagnosis.        Synoptic Report     THYROID GLAND   8th Edition - Protocol posted: 3/22/2023THYROID GLAND: RESECTION - All Specimens  SPECIMEN   Procedure  Total thyroidectomy   TUMOR   Tumor Focality  Multifocal   Tumor Characteristics     Tumor Site  Right lobe     Left lobe   Tumor Size  Greatest Dimension (Centimeters): 1.1 cm   Histologic Tumor Types and Subtypes  Papillary carcinoma, classic subtype   Tumor Proliferative Activity     Mitotic Rate  Less than 3 mitoses per 2mm2   Tumor Necrosis  Not identified   Angioinvasion (vascular invasion)  Not identified   Lymphatic Invasion  Not identified   Extrathyroidal Extension  Present, microscopic strap muscle invasion only, with no clinical / macroscopic evidence of invasion   Margin Status  All margins negative for carcinoma   Distance from Invasive Carcinoma to Closest Margin  At least: 1  mm   REGIONAL LYMPH NODES   Regional Lymph Node Status  All regional lymph nodes negative for tumor   Number of Lymph Nodes Examined  2   Nata Level(s) Examined  Level VI   pTNM CLASSIFICATION (AJCC 8th Edition)   Reporting of pT, pN, and (when applicable) pM categories is based on information available to the pathologist at the time the report is issued. As per the AJCC (Chapter 1, 8th Ed.) it is the managing physician’s responsibility to establish the final pathologic stage based upon all pertinent information, including but potentially not limited to this pathology report.        pT Category  pT1b   pN Category  pN0   .          6/2024 TSH 83, TgAb < 127 , Tg  1.1. LT4 w/d  8/29/2024 TSH 0.8 , TgAb < 55, Tg 0.2  11/12/2024 TSH 0.2, FT4 2, TgAb < 16.9, Tg  <0.2  2/21/2025 TSH 0.01, FT4 2.5  tg pending  on LT4 150 mcg     ASSESSMENT AND PLAN:  49 year old female who presents with  PTC s/p surgery on 6/5/2024  PTC s/p surgery on 6/5/2024 s/p JOSE  106 mci after withdrawal.  Stage I, pT1b pN0M0.  Multifocal classic PTC,  BL, largest 1.1 CM with microscopic ETE    JOSE I-131 on 7/12/24 7/19/2024 Post Tx  WBS uptake in the thyroid bed. No abnormal activity is seen outside of the neck        Labs showed TSH is below target so we decreased the dose by skipping once a week . On LT4 150 mcg/day  Pending Tg   JOSE diagnostic scan in 7/2025  Plan   Labs and follow up in  3 mo   US in April . Will review   Tg pending   TSH below target and FT4 is high - we decreased thyroid dose by skipping once a week. I will send Levothyroxine 125 mcg/day to your pharmacy to start after you finish Levothyroxine 150  mcg tablets   D3 2000 unit/day  Thyroid medication dose: Levothyroxine     Take you medication on empty stomach, not with any other medication or food. Wait 60 minutes before eating. Wait 4 hours before taking Vitamins, Calcium or iron. On the morning of the lab test, please take the medication after the blood test not before. Do not take Biotin 1 week before blood test.      This is  very helpful website   [email or web address]  [email or web address]    Don’t take your thyroid hormone at the same time as:  Walnuts.  Soybean flour.  Cottonseed meal.  Iron supplements or multivitamins containing iron.  Calcium supplements.  Antacids that contain aluminum, magnesium or calcium.  Some ulcer medicines, such as sucralfate (Carafate).  Some cholesterol-lowering drugs, such as those containing cholestyramine (Prevalite, Locholest) and colestipol (Colestid).  To avoid possible problems, eat these foods or use these products several hours before or after you take your thyroid medicine.    Supplements containing biotin, common in hair and nail products, can make it hard to measure how much thyroid hormone is in the body. Biotin does not affect thyroid hormone levels. But supplements that have biotin should be stopped for at least a week before measuring thyroid function so that the measurement is correct.        PAST MEDICAL  HISTORY:   Past Medical History:    Arthritis    Atrial septal defect (HCC)    corrected via surgery as a child at age 10.     Congenital anomaly of heart (HCC)   PTC s/p surgery in 2024      PAST SURGICAL HISTORY:   Past Surgical History:   Procedure Laterality Date          Hysterectomy  2017    irregular and heavy cycles.     Needle biopsy left      benign    Splint, hammer toe        CURRENT MEDICATIONS:     [START ON 3/25/2025] levothyroxine 125 MCG Oral Tab Take 1 tablet (125 mcg total) by mouth before breakfast. 90 tablet 1    levothyroxine 150 MCG Oral Tab Take 1 tablet (150 mcg total) by mouth before breakfast. 90 tablet 0      ALLERGIES:  Allergies   Allergen Reactions    Erythromycin NAUSEA AND VOMITING and OTHER (SEE COMMENTS)     Reactions: migraines, vomiting.   migraines      Penicillins ANAPHYLAXIS and UNKNOWN     NO SKIN BLISTERING  States her mother told her she almost  as an infant when she received pcn      SOCIAL HISTORY:    Social History     Socioeconomic History    Marital status: Single   Tobacco Use    Smoking status: Never    Smokeless tobacco: Never   Vaping Use    Vaping status: Never Used   Substance and Sexual Activity    Alcohol use: Never    Drug use: Never    Sexual activity: Yes     Partners: Female   Used to be    Smoking: no  Marijuana:  no  Etoh: no  Drugs:   no  FAMILY HISTORY:   Family History   Problem Relation Age of Onset    Uterine Cancer Mother 58    Breast Cancer Mother 55    Diabetes Father     Heart Disorder Maternal Grandmother         had and ASD    Prostate Cancer Maternal Grandfather     Diabetes Paternal Grandmother     Diabetes Paternal Uncle          PHYSICAL EXAM:   Height: 5' 11\" (180.3 cm) ( 124)  Weight: 194 lb (88 kg) (1248)  BSA (Calculated - sq m): 2.08 sq meters ( 124)  Pulse: 65 ( 1248)  BP: 108/74 (1248)  Temp: --  Do Not Use - Resp Rate: --  SpO2: --    Body mass index is 27.06 kg/m².  Scar  at the base of hte neck     No tremors   No LAP     DATA:     Pertinent data reviewed       Latest Reference Range & Units 08/29/24 13:41 11/12/24 15:47   T4,Free (Direct) 0.8 - 1.7 ng/dL  2.0 (H)   TSH 0.550 - 4.780 uIU/mL 0.890 0.252 (L)      Latest Reference Range & Units 08/29/24 13:41 11/12/24 15:47   Thyroglob Ab 0.0 - 0.9 IU/mL 55.2 (H) 16.9 (H)   Thyroglobulin by LCMS 1.5 - 38.5 ng/mL <0.2 (L) <0.2 (L)   (H): Data is abnormally high  (L): Data is abnormally low   07/19/24 08:13   NM I-131 POST ABLATION WB STUDY (CPT=78018) Rpt   Date of I-131 therapy:   7/12/24   Pathology: Multifocal papillary cell carcinoma, stage pT1bN0   Total body iodine scan showed:   Prominent remnant and/or lymphadenopathy in the thyroid bed.   Dose of Erika-131 therapy capsule:   106.3-mCi   IMPRESSION:   Post therapy total body iodine scan confirms the uptake in the thyroid bed.  No abnormal activity is seen outside of the neck        Latest Reference Range & Units 06/25/24 14:05   T4,Free (Direct) 0.8 - 1.7 ng/dL 0.4 (L)   TSH 0.550 - 4.780 mIU/mL 83.318 (H)      Latest Reference Range & Units 06/25/24 14:05 06/28/24 10:17   Thyroglob Ab 0.0 - 0.9 IU/mL 114.0 (H) 127.2 (H)   Thyroglobulin by LCMS 1.5 - 38.5 ng/mL 1.8 1.1 (L)      Latest Reference Range & Units 06/25/24 14:05 06/28/24 10:17   Thyroglob Ab 0.0 - 0.9 IU/mL 114.0 (H) 127.2 (H)   Thyroglobulin by LCMS 1.5 - 38.5 ng/mL 1.8 1.1 (L)   (H): Data is abnormally high  (L): Data is abnormally low           No results for input(s): \"TSH\", \"T4F\", \"T3F\", \"THYP\" in the last 72 hours.  No results found.    Orders Placed This Encounter   Procedures    Thyroglobulin Antibody and Tumor Marker    TSH and Free T4     Orders Placed This Encounter    Thyroglobulin Antibody and Tumor Marker     Standing Status:   Future     Standing Expiration Date:   3/4/2026     Order Specific Question:   Release to patient     Answer:   Immediate    TSH and Free T4     Order Specific Question:   Release  to patient     Answer:   Immediate    levothyroxine 125 MCG Oral Tab     Sig: Take 1 tablet (125 mcg total) by mouth before breakfast.     Dispense:  90 tablet     Refill:  1          This is a specialized patient consultation in endocrinology and required comprehensive review of prior records, as well as current evaluation, with time required for consideration of complex endocrine issues and consultation. For this visit, I personally interviewed the patient, and family member if accompanied, performed the pertinent parts of the history and physical examination. ROS included screening for appropriate endocrine conditions.   Today's diagnosis and plan were reviewed in detail with the patient who states understanding and agrees with plan. I discussed with the patient possible diagnosis, differential diagnosis, need for work up, treatment options, alternatives and side effects.     Please see note for details about time spent which includes:   · pre-visit preparation  · reviewing records  · face to face time with the patient   · timely documentation of the encounter  · ordering medications/tests  · communication with care team  · care coordination    I appreciate the opportunity to be part of your patient's medical care and will keep you, as the referring and primary physicians, informed about the care of your patient. Please feel free to contact me should you have any questions.    The 21st Century Cures Act makes medical notes like these available to patients in the interest of transparency. Please be advised this is a medical document. Medical documents are intended to carry relevant information, facts as evident, and the clinical opinion of the practitioner. The medical note is intended as peer to peer communication and may appear blunt or direct. It is written in medical language and may contain abbreviations or verbiage that are unfamiliar.   Jl Obrien MD

## 2025-03-04 NOTE — PATIENT INSTRUCTIONS
Labs and follow up in  3 mo   US in April . Will review   Tg pending   TSH below target and FT4 is high - we decreased thyroid dose by skipping once a week. I will send Levothyroxine 125 mcg/day to your pharmacy to start after you finish Levothyroxine 150  mcg tablets

## 2025-03-10 ENCOUNTER — TELEPHONE (OUTPATIENT)
Dept: ENDOCRINOLOGY CLINIC | Facility: CLINIC | Age: 50
End: 2025-03-10

## 2025-03-10 NOTE — TELEPHONE ENCOUNTER
Dr. Obrien --    Pt is scheduled for knee replacement surgery tomorrow, requesting endocrine letter of clearance. Per chart review, pt was okay from your standpoint.     Letter pended for you to review, can fax to surgeon's office after.

## 2025-03-10 NOTE — TELEPHONE ENCOUNTER
Lakisha states patient is scheduled for procedure tomorrow and is requesting surgical clearance for Right Total Knee Arthroplasty.  Please fax to [phone] attn: Lakisha.  For additional questions please call.  Thank you.

## 2025-03-12 LAB
LC THYROGLOBIN LCMS: <0.2 NG/ML
THYROGLOB AB: 8.4 IU/ML

## 2025-04-04 ENCOUNTER — TELEPHONE (OUTPATIENT)
Dept: ENDOCRINOLOGY CLINIC | Facility: CLINIC | Age: 50
End: 2025-04-04

## 2025-04-04 ENCOUNTER — HOSPITAL ENCOUNTER (OUTPATIENT)
Dept: ULTRASOUND IMAGING | Facility: HOSPITAL | Age: 50
Discharge: HOME | End: 2025-04-04
Attending: INTERNAL MEDICINE
Payer: COMMERCIAL

## 2025-04-04 DIAGNOSIS — E55.9 VITAMIN D DEFICIENCY: ICD-10-CM

## 2025-04-04 DIAGNOSIS — E89.0 HYPOTHYROIDISM, POSTSURGICAL: ICD-10-CM

## 2025-04-04 DIAGNOSIS — C73 THYROID CANCER (HCC): ICD-10-CM

## 2025-04-04 DIAGNOSIS — E07.9 THYROID DISEASE: ICD-10-CM

## 2025-04-04 PROCEDURE — 76536 US EXAM OF HEAD AND NECK: CPT | Performed by: INTERNAL MEDICINE

## 2025-04-04 NOTE — TELEPHONE ENCOUNTER
Dalia Hammer ,    US showed Multiple cervical lymph nodes. largest measuring 1.5 x 0.5 cm.   Will monitor with labs and US  No concerning lymph node on US      Labs showed improvement in markers     Will discuss more next visit   Thanks             Latest Reference Range & Units 06/28/24 10:17 08/29/24 13:41 11/12/24 15:47 02/21/25 09:59   Thyroglob Ab 0.0 - 0.9 IU/mL 127.2 (H) 55.2 (H) 16.9 (H) 8.4 (H)   Thyroglobulin by LCMS 1.5 - 38.5 ng/mL 1.1 (L) <0.2 (L) <0.2 (L) <0.2 (L)

## (undated) DEVICE — PACK CDS HEAD

## (undated) DEVICE — NECK ACCESSORY: Brand: MEDLINE INDUSTRIES, INC.

## (undated) DEVICE — GAMMEX® PI HYBRID SIZE 8, STERILE POWDER-FREE SURGICAL GLOVE, POLYISOPRENE AND NEOPRENE BLEND: Brand: GAMMEX

## (undated) DEVICE — SUCTION CANISTER, 3000CC,SAFELINER: Brand: DEROYAL

## (undated) DEVICE — GOWN,SIRUS,FAB REINF,RAGLAN,XL,STERILE: Brand: MEDLINE

## (undated) DEVICE — SUT PROL 4-0 18IN N ABSRB BLU L19MM FS-2

## (undated) DEVICE — DRAIN SURG W7MMXL20CM SIL HUBLESS FLAT FLL

## (undated) DEVICE — SUT CHRM GUT 3-0 27IN SH ABSRB UD 26MM 1/2

## (undated) DEVICE — SUT PERMA- 2-0 18IN FS NABSRB BLK 26MM 3/8

## (undated) DEVICE — EVACUATOR SUR 100CC SIL BLB WND

## (undated) DEVICE — APPLICATOR PREP 26ML CHG 2% ISO ALC 70%

## (undated) DEVICE — SOLUTION IRRIG 1000ML 0.9% NACL USP BTL

## (undated) DEVICE — SUT PERMA- 2-0 18IN NABSRB BLK TIE SILK

## (undated) NOTE — LETTER
3/10/2025  Jaquelin Hernandez  [address] [facility] [facility] [address]         My patient, Jaquelin Hernandez, who is scheduled to Right Total Knee Arthoplasty on 3/11. As the patient's endocrinologist, I have conducted a thorough evaluation of their thyroid condition and overall health status, and I believe they are medically optimized for the procedure.      Please do not hesitate to contact me if you require any additional information.    Sincerely,      Jl Obrien MD  [address], [address] [address]  Ph: [phone]  Fax: [phone]

## (undated) NOTE — Clinical Note
Good afternoon.  I was hoping that perhaps your staff could contact this patient to have her be seen by you or one of your colleagues regarding her recent finding of papillary carcinoma.  She is now status post total thyroidectomy and I will be sending her for a whole-body nuclear scan in 3 to 4 weeks but she will need follow-up for her postsurgical hypothyroidism and management of her papillary carcinoma.  Thank you very much.